# Patient Record
Sex: MALE | Race: WHITE
[De-identification: names, ages, dates, MRNs, and addresses within clinical notes are randomized per-mention and may not be internally consistent; named-entity substitution may affect disease eponyms.]

---

## 2018-11-09 ENCOUNTER — HOSPITAL ENCOUNTER (INPATIENT)
Dept: HOSPITAL 72 - EMR | Age: 68
LOS: 4 days | Discharge: HOME | DRG: 282 | End: 2018-11-13
Payer: MEDICARE

## 2018-11-09 VITALS — SYSTOLIC BLOOD PRESSURE: 114 MMHG | DIASTOLIC BLOOD PRESSURE: 66 MMHG

## 2018-11-09 VITALS — SYSTOLIC BLOOD PRESSURE: 114 MMHG | DIASTOLIC BLOOD PRESSURE: 97 MMHG

## 2018-11-09 VITALS — SYSTOLIC BLOOD PRESSURE: 141 MMHG | DIASTOLIC BLOOD PRESSURE: 71 MMHG

## 2018-11-09 VITALS — SYSTOLIC BLOOD PRESSURE: 158 MMHG | DIASTOLIC BLOOD PRESSURE: 86 MMHG

## 2018-11-09 VITALS — BODY MASS INDEX: 17.89 KG/M2 | WEIGHT: 114 LBS | HEIGHT: 67 IN

## 2018-11-09 VITALS — DIASTOLIC BLOOD PRESSURE: 94 MMHG | SYSTOLIC BLOOD PRESSURE: 166 MMHG

## 2018-11-09 VITALS — DIASTOLIC BLOOD PRESSURE: 71 MMHG | SYSTOLIC BLOOD PRESSURE: 156 MMHG

## 2018-11-09 VITALS — SYSTOLIC BLOOD PRESSURE: 114 MMHG | DIASTOLIC BLOOD PRESSURE: 51 MMHG

## 2018-11-09 VITALS — SYSTOLIC BLOOD PRESSURE: 121 MMHG | DIASTOLIC BLOOD PRESSURE: 45 MMHG

## 2018-11-09 VITALS — SYSTOLIC BLOOD PRESSURE: 139 MMHG | DIASTOLIC BLOOD PRESSURE: 76 MMHG

## 2018-11-09 VITALS — SYSTOLIC BLOOD PRESSURE: 94 MMHG | DIASTOLIC BLOOD PRESSURE: 45 MMHG

## 2018-11-09 VITALS — SYSTOLIC BLOOD PRESSURE: 115 MMHG | DIASTOLIC BLOOD PRESSURE: 50 MMHG

## 2018-11-09 VITALS — DIASTOLIC BLOOD PRESSURE: 51 MMHG | SYSTOLIC BLOOD PRESSURE: 154 MMHG

## 2018-11-09 VITALS — SYSTOLIC BLOOD PRESSURE: 135 MMHG | DIASTOLIC BLOOD PRESSURE: 78 MMHG

## 2018-11-09 DIAGNOSIS — I48.91: Primary | ICD-10-CM

## 2018-11-09 DIAGNOSIS — F32.9: ICD-10-CM

## 2018-11-09 DIAGNOSIS — Z88.0: ICD-10-CM

## 2018-11-09 DIAGNOSIS — J44.9: ICD-10-CM
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
## 2018-11-09 DIAGNOSIS — I34.0: ICD-10-CM

## 2018-11-09 DIAGNOSIS — Z88.1: ICD-10-CM

## 2018-11-09 DIAGNOSIS — F43.10: ICD-10-CM

## 2018-11-09 DIAGNOSIS — I50.9: ICD-10-CM

## 2018-11-09 DIAGNOSIS — Z90.49: ICD-10-CM

## 2018-11-09 DIAGNOSIS — Z79.01: ICD-10-CM

## 2018-11-09 DIAGNOSIS — I25.2: ICD-10-CM

## 2018-11-09 DIAGNOSIS — S00.512A: ICD-10-CM

## 2018-11-09 DIAGNOSIS — M47.892: ICD-10-CM

## 2018-11-09 DIAGNOSIS — F17.200: ICD-10-CM

## 2018-11-09 DIAGNOSIS — Z95.5: ICD-10-CM

## 2018-11-09 DIAGNOSIS — I21.4: ICD-10-CM

## 2018-11-09 DIAGNOSIS — I25.10: ICD-10-CM
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
## 2018-11-09 DIAGNOSIS — F41.9: ICD-10-CM

## 2018-11-09 LAB
ADD MANUAL DIFF: NO
ALBUMIN SERPL-MCNC: 3.5 G/DL (ref 3.4–5)
ALBUMIN/GLOB SERPL: 0.8 {RATIO} (ref 1–2.7)
ALP SERPL-CCNC: 99 U/L (ref 46–116)
ALT SERPL-CCNC: 23 U/L (ref 12–78)
ANION GAP SERPL CALC-SCNC: 8 MMOL/L (ref 5–15)
APTT BLD: 28 SEC (ref 23–33)
AST SERPL-CCNC: 48 U/L (ref 15–37)
BASOPHILS NFR BLD AUTO: 0.6 % (ref 0–2)
BILIRUB SERPL-MCNC: 0.6 MG/DL (ref 0.2–1)
BUN SERPL-MCNC: 17 MG/DL (ref 7–18)
CALCIUM SERPL-MCNC: 8.8 MG/DL (ref 8.5–10.1)
CHLORIDE SERPL-SCNC: 105 MMOL/L (ref 98–107)
CO2 SERPL-SCNC: 30 MMOL/L (ref 21–32)
CREAT SERPL-MCNC: 0.9 MG/DL (ref 0.55–1.3)
EOSINOPHIL NFR BLD AUTO: 1.5 % (ref 0–3)
ERYTHROCYTE [DISTWIDTH] IN BLOOD BY AUTOMATED COUNT: 13 % (ref 11.6–14.8)
GLOBULIN SER-MCNC: 4.5 G/DL
HCT VFR BLD CALC: 42 % (ref 42–52)
HGB BLD-MCNC: 13.4 G/DL (ref 14.2–18)
INR PPP: 1 (ref 0.9–1.1)
LYMPHOCYTES NFR BLD AUTO: 13.5 % (ref 20–45)
MCV RBC AUTO: 78 FL (ref 80–99)
MONOCYTES NFR BLD AUTO: 8.3 % (ref 1–10)
NEUTROPHILS NFR BLD AUTO: 76.1 % (ref 45–75)
PLATELET # BLD: 182 K/UL (ref 150–450)
POTASSIUM SERPL-SCNC: 4.7 MMOL/L (ref 3.5–5.1)
RBC # BLD AUTO: 5.4 M/UL (ref 4.7–6.1)
SODIUM SERPL-SCNC: 143 MMOL/L (ref 136–145)
WBC # BLD AUTO: 7.6 K/UL (ref 4.8–10.8)

## 2018-11-09 PROCEDURE — 94664 DEMO&/EVAL PT USE INHALER: CPT

## 2018-11-09 PROCEDURE — 96374 THER/PROPH/DIAG INJ IV PUSH: CPT

## 2018-11-09 PROCEDURE — 72125 CT NECK SPINE W/O DYE: CPT

## 2018-11-09 PROCEDURE — 85730 THROMBOPLASTIN TIME PARTIAL: CPT

## 2018-11-09 PROCEDURE — 71045 X-RAY EXAM CHEST 1 VIEW: CPT

## 2018-11-09 PROCEDURE — 94640 AIRWAY INHALATION TREATMENT: CPT

## 2018-11-09 PROCEDURE — 80061 LIPID PANEL: CPT

## 2018-11-09 PROCEDURE — 90715 TDAP VACCINE 7 YRS/> IM: CPT

## 2018-11-09 PROCEDURE — 85007 BL SMEAR W/DIFF WBC COUNT: CPT

## 2018-11-09 PROCEDURE — 80048 BASIC METABOLIC PNL TOTAL CA: CPT

## 2018-11-09 PROCEDURE — 70450 CT HEAD/BRAIN W/O DYE: CPT

## 2018-11-09 PROCEDURE — 93017 CV STRESS TEST TRACING ONLY: CPT

## 2018-11-09 PROCEDURE — 85025 COMPLETE CBC W/AUTO DIFF WBC: CPT

## 2018-11-09 PROCEDURE — 85610 PROTHROMBIN TIME: CPT

## 2018-11-09 PROCEDURE — 93005 ELECTROCARDIOGRAM TRACING: CPT

## 2018-11-09 PROCEDURE — 84100 ASSAY OF PHOSPHORUS: CPT

## 2018-11-09 PROCEDURE — 78452 HT MUSCLE IMAGE SPECT MULT: CPT

## 2018-11-09 PROCEDURE — 84484 ASSAY OF TROPONIN QUANT: CPT

## 2018-11-09 PROCEDURE — 71275 CT ANGIOGRAPHY CHEST: CPT

## 2018-11-09 PROCEDURE — 90471 IMMUNIZATION ADMIN: CPT

## 2018-11-09 PROCEDURE — 83735 ASSAY OF MAGNESIUM: CPT

## 2018-11-09 PROCEDURE — 93306 TTE W/DOPPLER COMPLETE: CPT

## 2018-11-09 PROCEDURE — 83880 ASSAY OF NATRIURETIC PEPTIDE: CPT

## 2018-11-09 PROCEDURE — 74176 CT ABD & PELVIS W/O CONTRAST: CPT

## 2018-11-09 PROCEDURE — 99285 EMERGENCY DEPT VISIT HI MDM: CPT

## 2018-11-09 PROCEDURE — 80053 COMPREHEN METABOLIC PANEL: CPT

## 2018-11-09 PROCEDURE — 85379 FIBRIN DEGRADATION QUANT: CPT

## 2018-11-09 PROCEDURE — 36415 COLL VENOUS BLD VENIPUNCTURE: CPT

## 2018-11-09 RX ADMIN — METOPROLOL TARTRATE SCH MG: 25 TABLET, FILM COATED ORAL at 22:04

## 2018-11-09 RX ADMIN — ATORVASTATIN CALCIUM SCH MG: 20 TABLET, FILM COATED ORAL at 22:04

## 2018-11-09 RX ADMIN — APIXABAN SCH MG: 2.5 TABLET, FILM COATED ORAL at 17:47

## 2018-11-09 NOTE — DIAGNOSTIC IMAGING REPORT
Indication: Abdominal pain. Trauma

 

Technique: Continuous helical transaxial imaging of the abdomen and pelvis was

obtained from the lung bases to the pubic symphysis. No intravenous contrast was

administered. Coronal 2-D reformats were also obtained. Automatic Exposure Control

was utilized.

 

 

Total Dose length Product (DLP):  498.11 mGycm

 

CT Dose Index Volume (CTDIvol):   10.21 mGy

 

Comparison: none

 

Findings: The lung bases are clear. There is no pneumothorax identified. No pleural

effusion identified.

 

Evaluation of solid organs limited without IV contrast material but no obvious trauma

identified. There is no free fluid or free air identified. Appendix is normal. There

is thickening of the wall the urinary bladder. Some diverticula noted in the colon.

No hydronephrosis seen. Rounded hyperdense nodules noted in both kidneys nonspecific

though probably hemorrhagic or proteinaceous cysts. A low-density round focus

measuring about 1 cm noted in the left kidney. Cholecystectomy noted.

 

Pneumobilia is present. Correlate for previous ERCP and papillotomy. Duodenal

diverticulum noted in the second portion of the duodenum. Aortoiliac calcifications

are present. Small left inguinal hernia containing fat demonstrated. Bilateral pars

interarticularis defects demonstrated at L5. Minimal anterolisthesis of L5 on S1

noted. No obvious fracture identified.

 

IMPRESSION:

 

No acute injury is identified. No free air or free fluid identified within the

abdomen or pelvis.

 

Pneumobilia presumably on the basis of previous papillotomy and ERCP. Correlate

clinically.

 

Bilateral pars interarticularis defects at L5. Minimal L5 on S1 spondylolisthesis.

 

Multiple additional incidental findings as described above.

 

.

 

 

The CT scanner at [facility] is accredited by the American College of

Radiology and the scans are performed using dose optimization techniques as

appropriate to a performed exam including Automatic Exposure control.

## 2018-11-09 NOTE — DIAGNOSTIC IMAGING REPORT
Indication: Headache and head trauma

 

Technique: Contiguous 5 mm thick transaxial imaging of the head obtained in a Siemens

Sensation 64 slice CT scanner.  Soft tissue and bone windows generated.  Automatic

Exposure Control was utilized.

 

 

Total Dose length Product (DLP):  1319.78 mGycm

 

CT Dose Index Volume (CTDIvol):   70.38 mGy

 

Comparison: none

 

Findings: There is moderate prominence of the ventricles, basal cisterns, and

cerebral sulci consistent with atrophy. Moderate,  nonspecific, white matter

hypoattenuation is noted throughout the brain consistent with chronic small vessel

disease.

 

There is no midline shift, edema, acute hemorrhage, mass effect, or abnormal

extra-axial fluid collections. Bones and extra osseous soft tissues are unremarkable.

 

Impression: No acute intracranial bleed, mass effect or edema.

 

Moderate atrophy of the brain.

 

Evidence of chronic small vessel disease involving white matter tracts.

 

 

 

The CT scanner at [facility] is accredited by the American College of

Radiology and the scans are performed using dose optimization techniques as

appropriate to a performed exam including Automatic Exposure control.

## 2018-11-09 NOTE — CONSULTATION
DATE OF CONSULTATION:  11/09/2018

CARDIOLOGY CONSULTATION



CONSULTING PHYSICIAN:  Camilo Pardo M.D.



REFERRING PHYSICIAN:  Gary Peter M.D.



REASON FOR CONSULTATION:  Atrial fibrillation with rapid ventricular

response.



HISTORY OF PRESENT ILLNESS:  The patient is a 68-year-old 

gentleman with a history of hypertension, severe COPD, and history of

cardiac arrhythmia on Eliquis, who came to the emergency room with

spitting of blood from his mouth.  He also fell off a ladder yesterday

that was 5 feet high and fell onto a pile of junk in a closet.  The

patient states he did not lose consciousness, but noticed bleeding from

his mouth.  The patient came to the emergency room.  He was found to be in

atrial fibrillation with rapid ventricular response, heart rate of 120

beats per minute.  The patient was admitted to telemetry.  However, at the

time of my evaluation, his heart rate was in the 140s.



REVIEW OF SYSTEMS:  Review of systems was negative other than what was

mentioned in history of present illness.



PAST MEDICAL HISTORY:

1. Hypertension.

2. History of cardiac arrhythmia, likely atrial fibrillation as the

patient is on Eliquis.

3. COPD.

4. Asthma.

5. Posttraumatic stress disorder.



FAMILY HISTORY:  Noncontributory.



SOCIAL HISTORY:  He has 3-pack a day smoking history, but has cut down to 1

pack a day.  Continue to drink alcohol.  Denies using street drugs.



PHYSICAL EXAMINATION:

VITAL SIGNS:  Blood pressure is 135/78, pulse 140, respirations 18, and he

is afebrile.

HEAD AND NECK:  Shows no JVD.

LUNGS:  Coarse rhonchi.

CARDIOVASCULAR:  Irregularly irregular.  S1 and S2 with no gallop or

murmur.

ABDOMEN:  Soft.

EXTREMITIES:  No pitting edema.  There is diffuse ecchymoses.



LABORATORY AND DIAGNOSTIC DATA:  White count of 7.2, hemoglobin 13.4,

hematocrit 42, and platelets of 182.  Sodium 142, potassium 4.7, BUN of

17, creatinine 0.9.  Troponin 0.304.



ASSESSMENT AND PLAN:

1. Elevated troponin.  The patient has a history of coronary artery

disease with prior stent placement three months ago.  He said he had it at

[facility].  Completely rule out myocardial infarction protocol with repeat

EKG and put him on aspirin beta-blocker and statin and try to control the

rate of atrial fibrillation.

2. Atrial fibrillation with rapid ventricular response.  I will transfer

the patient to intensive care unit on Cardizem drip.  Since his troponin

is also elevated, repeat EKG and get an echocardiogram.  Currently he does

not have any chest pain, put him on aspirin and resume his Eliquis until

further evaluation is completed.

3. Severe COPD and heavy smoking.



Thank you very much, Dr. Peter, for allowing me participate in the

care of this patient.  Please do not hesitate to contact me for any

questions regarding my evaluation.



Sincerely,









  ______________________________________________

  Camilo Pardo M.D.





DR:  Liv

D:  11/09/2018 13:15

T:  11/09/2018 21:33

JOB#:  [id number]

CC:

## 2018-11-09 NOTE — EMERGENCY ROOM REPORT
History of Present Illness


General


Chief Complaint:  Multiple Trauma/Fall


Source:  Patient





Present Illness


HPI


68-year-old male with a history of COPD, arrhythmia on eliquis, presents with 

spitting up blood from his mouth, he reports no pain complaints, but reports 

that he fell off a ladder yesterday, it was roughly 5 feet high, it tipped over 

and he fell onto a pile of junk in the closet, he did not hit his head or lose 

consciousness, and he didn't think much of it until this morning when he 

started noticing blood in his mouth.  He denies neck or back pain, vomiting, 

does report he's been bleeding from his arms from the fall yesterday but 

stopped temporarily and starts again so he finally came in for an evaluation.


Allergies:  


Coded Allergies:  


     AMPICILLIN (Verified  Allergy, Unknown, 8/3/18)


Uncoded Allergies:  


     PENICILLIN (Allergy, Unknown, 8/3/18)





Patient History


Past Medical History:  see triage record


Reviewed Nursing Documentation:  PMH: Agreed; PSxH: Agreed





Nursing Documentation-PMH


Past Medical History:  No History, Except For


Hx Cardiac Problems:  Yes - bradycardia, Afib


Hx Hypertension:  Yes


Hx Pacemaker:  No


Hx Asthma:  Yes


Hx COPD:  Yes


Hx Diabetes:  No


Hx Cancer:  No


Hx Gastrointestinal Problems:  No


Hx Dialysis:  No


History Of Psychiatric Problem:  Yes - PTSD


Hx Neurological Problems:  No


Hx Cerebrovascular Accident:  No


Hx Seizures:  No





Review of Systems


All Other Systems:  negative except mentioned in HPI





Physical Exam





Vital Signs








  Date Time  Temp Pulse Resp B/P (MAP) Pulse Ox O2 Delivery O2 Flow Rate FiO2


 


11/9/18 09:42 97.9 119 23 154/51 95 Room Air  








Sp02 EP Interpretation:  reviewed, normal


General Appearance:  alert, non-toxic, mild distress


Head:  normocephalic


Eyes:  bilateral eye normal inspection, bilateral eye PERRL, bilateral eye EOMI


ENT:  normal ENT inspection, hearing grossly normal, normal pharynx, no 

angioedema, normal voice, moist mucus membranes, other - Multiple missing teeth

, dried blood and abrasions on the internal aspect of the upper and lower lip, 

no loose teeth


Neck:  normal inspection, full range of motion, supple, supple/symm/no masses


Respiratory:  chest non-tender, lungs clear, normal breath sounds, no rhonchi, 

speaking full sentences, wheezing, expiration, chest symmetrical, palpation of 

chest normal


Cardiovascular #1:  normal peripheral pulses, regular rate, rhythm, no edema, 

no gallop, no JVD, no murmur, no rub, tachycardia


Cardiovascular #2:  2+ radial (R), 2+ radial (L)


Gastrointestinal:  normal inspection, non tender, soft, no mass, no guarding, 

no rebound


Rectal:  deferred


Genitourinary:  normal inspection, no CVA tenderness


Musculoskeletal:  back normal, gait/station normal, normal range of motion, non-

tender, no calf tenderness


Neurologic:  alert, responsive, CNs III-XII nml as tested, motor strength/tone 

normal, sensory intact, speech normal


Psychiatric:  judgement/insight normal, memory normal, mood/affect normal


Skin:  normal color, no rash, warm/dry, normal turgor, other - A few 1 cm Skin 

tears on bilateral Arms no lacerations, abrasions


Lymphatic:  no adenopathy





Medical Decision Making


Diagnostic Impression:  


 Primary Impression:  


 COPD (chronic obstructive pulmonary disease)


 Additional Impressions:  


 Fall


 Skin tear


 Abrasion


 Afib


ER Course


Patient reported spitting up blood, but he has obvious oral abrasions, which 

are the likely source, he is on Plavix as well as an anticoagulant for his 

known history of arrhythmia, he is slightly short of breath due to his known 

COPD and I have treated him with neb treatments as well as steroids, obtain CT 

head and C-spine as well as abdomen and pelvis to evaluate for any spontaneous 

bleeds although patient did not have any pain complaints he seems somewhat 

unreliable historian because he initially can't calmly why he is on blood 

thinners and then eventually does admit that he has an arrhythmia.





patient found to have an elevated troponin, but as he is on an anticoagulated 

on Plavix and intermittent from his mouth, I will not give him aspirin; he is 

in slightly rapid A. fib, and he is also having COPD exacerbation,will be 

admitted to telemetry.


EKG Diagnostic Results


EKG Time:  10:02


EP Interpretation:  afib rate 107, no stemi


Rate:  tachycardiac


Rhythm:  other - afib


ST Segments:  no acute changes


ASA given to the pt in ED:  No





Rhythm Strip Diag. Results


Rhythm Strip Time:  09:57


EP Interpretation:  yes


Rate:  106


Rhythm:  no PVC's, no ectopy, other - A. fib





Chest X-Ray Diagnostic Results


Chest X-Ray Diagnostic Results :  


   Chest X-Ray Ordered:  Yes


   # of Views/Limited/Complete:  1 View


   Indication:  Shortness of Breath


   EP Interpretation:  Yes


   Interpretation:  no consolidation, no effusion, no pneumothorax, no acute 

cardiopulmonary disease


   Impression:  No acute disease


   Electronically Signed by:  Tank Fernandes MD





Last Vital Signs








  Date Time  Temp Pulse Resp B/P (MAP) Pulse Ox O2 Delivery O2 Flow Rate FiO2


 


11/9/18 09:42 97.9 119 23 154/51 95 Room Air  








Disposition:  ADMITTED AS INPATIENT


Condition:  Stable


Signed Out To:  Dr. Don for TANK Corcoran M.D Nov 9, 2018 09:59

## 2018-11-09 NOTE — HISTORY AND PHYSICAL
History of Present Illness


General


Date patient seen:  Nov 9, 2018


Time patient seen:  13:00


Reason for Hospitalization:  Afib RVR





Present Illness


HPI


67 yo M PMH of Afib wih RVR on Elaquis, CHF, MI s/p PCI on 3/18 at [facility], COPD

, MDD, admitted s/p 5 foot mechanical fall from a ladder. Upon Evaluation in 

the ED patient was found to have elevated troponin of 0.25 and Afib RVR with HR 

146. Patient states he was getting on a ladder at home to fix something when 

the ladder slipped and he felt. He denies any inciting chest pain, dizziness, 

sob, of lightheadedness. Patient was been admitted may time before for Afb with 

RVR. Other than his fall he denies any unusual activities including drug use. 

Patient admits to history of smoking tobacco. Patient is unable to list all his 

medications but states that he has been taking them. Patient also initially 

admitted to spitting up blood but says it is resolved. 





At this time patient continues to deny chest pain, shortness of breath, 

dizziness, visual changes, weakness. 





Code Status: Full Code


Allergies:  


Coded Allergies:  


     AMPICILLIN (Verified  Allergy, Unknown, 8/3/18)


Uncoded Allergies:  


     PENICILLIN (Allergy, Unknown, 8/3/18)





Medication History


Scheduled


Apixaban (Eliquis), 5 MG PO TWICE A DAY, (Reported)


Aspirin Ec* (Aspirin Ec*), 81 MG ORAL DAILY, (Reported)


Atorvastatin Calcium* (Atorvastatin Calcium*), 40 MG ORAL BEDTIME, (Reported)


Carvedilol (Coreg), 12.5 MG ORAL EVERY 12 HOURS, (Reported)


Carvedilol (Coreg), 12.5 MG ORAL EVERY 12 HOURS, (Reported)


Clopidogrel Bisulfate* (Plavix*), 75 MG ORAL DAILY, (Reported)


Docusate Sodium* (Docusate Sodium*), 100 MG ORAL Q12HR, (Reported)


Famotidine (Famotidine), 20 MG ORAL TWICE A DAY, (Reported)


Furosemide (Furosemide), 40 MG PO DAILY, (Reported)


Ipratropium/Albuterol Sulfate (DuoNeb 0.5-3(2.5)mg/3ml), 3 ML HHN Q4HR, (

Reported)


Prednisone* (Prednisone*), 30 MG ORAL DAILY, (Reported)


Prednisone* (Prednisone*), 30 MG ORAL DAILY, (Reported)





Scheduled PRN


Acetaminophen* (Acetaminophen 325MG Tablet*), 650 MG ORAL Q4H PRN for Mild Pain/

Temp > 100.5, (Reported)


Doxycycline Hyclate* (Vibramycin*), 100 MG ORAL EVERY 12 HOURS PRN for x3 days, 

(Reported)


Polyethylene Glycol 3350* (Miralax*), 17 GM ORAL Q12HR PRN for Constipation, (

Reported)


Tramadol Hcl* (Ultram*), 50 MG ORAL Q6H PRN for For Pain


Zolpidem Tartrate* (Ambien*), 5 MG ORAL BEDTIME PRN for Insomnia, (Reported)





Patient History


History Provided By:  Patient, Medical Record


Healthcare decision maker





Resuscitation status





Advanced Directive on File








Past Medical/Surgical History


Past Medical/Surgical History:  


(1) S/P cholecystectomy


(2) Status post left heart catheterization by percutaneous approach


(3) COPD (chronic obstructive pulmonary disease)


(4) Afib


(5) Atrial fibrillation with RVR


(6) Acute systolic CHF (congestive heart failure)





Social History


Social History:  


(1) Tobacco abuse





Review of Systems


All Other Systems:  negative except mentioned in HPI


ROS Narrative


aside from HPI all other ROS are negative including more than 12 systems





Physical Exam


General Appearance:  WD/WN, no apparent distress, alert, other


Lines, tubes and drains:  peripheral


HEENT:  normocephalic, other - abrasion on forehead, and in mouth, not bleeding


Neck:  non-tender, normal alignment, supple


Respiratory/Chest:  chest wall non-tender, lungs clear, other - inspiratory 

wheezes, patient 02 sat ia 100% on room air


Cardiovascular/Chest:  normal peripheral pulses, normal rate, no gallop/murmur, 

no JVD, tachycardia - rate 135-145


Abdomen:  normal bowel sounds, non tender, soft, no organomegaly, no mass


Extremities:  normal range of motion, non-tender, normal inspection, no calf 

tenderness, normal capillary refill, non-pitting


Skin Exam:  normal pigmentation, warm/dry


Neurologic:  CNs II-XII grossly normal, no motor/sensory deficits, alert, 

oriented x 3, normal mood/affect





Last 24 Hour Vital Signs








  Date Time  Temp Pulse Resp B/P (MAP) Pulse Ox O2 Delivery O2 Flow Rate FiO2


 


11/9/18 12:45 97.9 99 20 135/78 100 Nasal Cannula 2.0 28


 


11/9/18 12:30 97.9 99 20 135/78 100 Nasal Cannula 2.0 28


 


11/9/18 11:49 97.9 106 20 139/76 100 Nasal Cannula 2.0 28


 


11/9/18 10:18  100 20  100 Nasal Cannula 2.0 28


 


11/9/18 10:08 97.9 99 22 154/51 100 Nasal Cannula 2.0 28


 


11/9/18 10:08  106 22   Nasal Cannula 2.0 28


 


11/9/18 09:59  101 22  100 Nasal Cannula 2.0 28


 


11/9/18 09:46  119 23   Room Air  


 


11/9/18 09:42 97.9 119 23 154/51 95 Room Air  











Laboratory Tests








Test


  11/9/18


10:05


 


White Blood Count


  7.6 K/UL


(4.8-10.8)


 


Red Blood Count


  5.40 M/UL


(4.70-6.10)


 


Hemoglobin


  13.4 G/DL


(14.2-18.0)  L


 


Hematocrit


  42.0 %


(42.0-52.0)


 


Mean Corpuscular Volume


  78 FL (80-99)


L


 


Mean Corpuscular Hemoglobin


  24.9 PG


(27.0-31.0)  L


 


Mean Corpuscular Hemoglobin


Concent 32.0 G/DL


(32.0-36.0)


 


Red Cell Distribution Width


  13.0 %


(11.6-14.8)


 


Platelet Count


  182 K/UL


(150-450)


 


Mean Platelet Volume


  8.5 FL


(6.5-10.1)


 


Neutrophils (%) (Auto)


  76.1 %


(45.0-75.0)  H


 


Lymphocytes (%) (Auto)


  13.5 %


(20.0-45.0)  L


 


Monocytes (%) (Auto)


  8.3 %


(1.0-10.0)


 


Eosinophils (%) (Auto)


  1.5 %


(0.0-3.0)


 


Basophils (%) (Auto)


  0.6 %


(0.0-2.0)


 


Prothrombin Time


  10.8 SEC


(9.30-11.50)


 


Prothromb Time International


Ratio 1.0 (0.9-1.1)  


 


 


Activated Partial


Thromboplast Time 28 SEC (23-33)


 


 


Sodium Level


  143 MMOL/L


(136-145)


 


Potassium Level


  4.7 MMOL/L


(3.5-5.1)


 


Chloride Level


  105 MMOL/L


()


 


Carbon Dioxide Level


  30 MMOL/L


(21-32)


 


Anion Gap


  8 mmol/L


(5-15)


 


Blood Urea Nitrogen


  17 mg/dL


(7-18)


 


Creatinine


  0.9 MG/DL


(0.55-1.30)


 


Estimat Glomerular Filtration


Rate > 60 mL/min


(>60)


 


Glucose Level


  91 MG/DL


()


 


Calcium Level


  8.8 MG/DL


(8.5-10.1)


 


Total Bilirubin


  0.6 MG/DL


(0.2-1.0)


 


Aspartate Amino Transf


(AST/SGOT) 48 U/L (15-37)


H


 


Alanine Aminotransferase


(ALT/SGPT) 23 U/L (12-78)


 


 


Alkaline Phosphatase


  99 U/L


()


 


Troponin I


  0.204 ng/mL


(0.000-0.056)


 


Total Protein


  8.0 G/DL


(6.4-8.2)


 


Albumin


  3.5 G/DL


(3.4-5.0)


 


Globulin 4.5 g/dL  


 


Albumin/Globulin Ratio


  0.8 (1.0-2.7)


L








Height (Feet):  5


Height (Inches):  9.00


Weight (Pounds):  150


Medications





Current Medications








 Medications


  (Trade)  Dose


 Ordered  Sig/Marissa


 Route


 PRN Reason  Start Time


 Stop Time Status Last Admin


Dose Admin


 


 Acetaminophen


  (Tylenol)  650 mg  Q4H  PRN


 ORAL


 Fever  11/9/18 13:15


 12/9/18 13:14 UNV  


 


 


 Acetaminophen


  (Tylenol)  650 mg  Q4H  PRN


 ORAL


 Mild Pain (Pain Scale 1-3)  11/9/18 13:15


 12/9/18 13:14 UNV  


 


 


 Albuterol/


 Ipratropium


  (Albuterol/


 Ipratropium)  3 ml  Q4HRT  PRN


 HHN


 Shortness of Breath  11/9/18 13:15


 11/14/18 13:14 UNV  


 


 


 Apixaban


  (Eliquis)  5 mg  BID


 ORAL


   11/9/18 13:15


 12/9/18 13:14 UNV  


 


 


 Aspirin


  (ASA)  81 mg  DAILY


 ORAL


   11/10/18 09:00


 12/10/18 08:59   


 


 


 Aspirin


  (ASA)  81 mg  DAILY


 ORAL


   11/9/18 13:15


 12/9/18 13:14 UNV  


 


 


 Atorvastatin


 Calcium


  (Lipitor)  20 mg  BEDTIME


 ORAL


   11/9/18 21:00


 12/9/18 20:59   


 


 


 Atorvastatin


 Calcium


  (Lipitor)  40 mg  BEDTIME


 ORAL


   11/9/18 21:00


 12/9/18 20:59 UNV  


 


 


 Clopidogrel


 Bisulfate


  (Plavix)  75 mg  DAILY


 ORAL


   11/9/18 13:15


 12/9/18 13:14 UNV  


 


 


 Dextrose


  (Dextrose 50%)  25 ml  Q30M  PRN


 IV


 Hypoglycemia  11/9/18 13:15


 12/9/18 13:14 UNV  


 


 


 Dextrose


  (Dextrose 50%)  50 ml  Q30M  PRN


 IV


 Hypoglycemia  11/9/18 13:15


 12/9/18 13:14 UNV  


 


 


 Diltiazem HCl


  (Cardizem)  10 mg  ONCE


 IVP


   11/9/18 13:30


 11/9/18 14:30   


 


 


 Diltiazem HCl 125


 mg/Dextrose  125 ml @ 0


 mls/hr  Q24H


 IV


   11/9/18 14:00


 11/10/18 13:59   


 


 


 Heparin Sodium


  (Porcine)


  (Heparin 5000


 units/ml)  5,000 units  EVERY 12  HOURS


 SUBQ


   11/9/18 21:00


 12/9/18 20:59 UNV  


 


 


 Lisinopril


  (Zestril)  5 mg  DAILY


 ORAL


   11/10/18 09:00


 12/10/18 08:59 UNV  


 


 


 Metoprolol


 Tartrate


  (Lopressor)  25 mg  EVERY 12  HOURS


 ORAL


   11/9/18 21:00


 12/9/18 20:59   


 


 


 Pantoprazole


  (Protonix)  40 mg  DAILY


 ORAL


   11/10/18 09:00


 12/10/18 08:59 UNV  


 


 


 Zolpidem Tartrate


  (Ambien)  5 mg  HSPRN  PRN


 ORAL


 Insomnia  11/9/18 13:15


 11/16/18 13:14 UNV  


 











Assessment/Plan


Problem List:  


(1) Atrial fibrillation with RVR


ICD Codes:  I48.91 - Unspecified atrial fibrillation


SNOMED:  271789708629754


(2) NSTEMI (non-ST elevated myocardial infarction)


ICD Codes:  I21.4 - Non-ST elevation (NSTEMI) myocardial infarction


SNOMED:  655188012


(3) CAD (coronary artery disease)


ICD Codes:  I25.10 - Atherosclerotic heart disease of native coronary artery 

without angina pectoris


SNOMED:  82213293


(4) CHF (congestive heart failure)


ICD Codes:  I50.9 - Heart failure, unspecified


SNOMED:  23380333


(5) COPD (chronic obstructive pulmonary disease)


ICD Codes:  J44.9 - Chronic obstructive pulmonary disease, unspecified


SNOMED:  99159286


(6) Tobacco abuse


ICD Codes:  Z72.0 - Tobacco use


SNOMED:  651740324


(7) Major depression


ICD Codes:  F32.9 - Major depressive disorder, single episode, unspecified


SNOMED:  164041469


Assessment/Plan


Afib RVR with NSTEMI and history of PCI s/p stent at [facility] 3/18/ history of 

CHF 


- patient uncontrolled and high risk, admitting to ICU for cardizem gtt, 

discussed with Cardiology Dr. Pardo 


- appreciate Cardiology consult by Dr. Pardo 


- no hep gtt at this time 


- cont asa, plavix, eliquis, BB, ACE-I, statin, 


- serial troponins and EKG 


- echo


- monitor BP goal Map >65 , HR < 110, plan will be to wean cardizem gtt and 

transition to oral medication once controlled 





COPD and current tobacco abuse 


- Pulmonology Consult: Dr. Chalino figueroa 


- patient unaware if he was on steroids, holding for now will monitor and 

discuss with pulmonology 


- nicotine patch 


- tobacco cessation education





Major Depression


- Psychiatry Consult: Dr. Medina 





Code Status


- spoke to patient regarding code status and clarified that patient would like 

to be FULL CODE. He mentions he was referred to a hospice program? but unsure 

why and was not explained well about his medical condition





DVT


- patient on sufficient anticoagulation





Dispo 


- pending 





I have spent 70 minutes regarding patient care, coordination and counseling. I 

have spent 30 minutes of critical care time with patient.











Charity Don DO Nov 9, 2018 13:47

## 2018-11-09 NOTE — DIAGNOSTIC IMAGING REPORT
Indication: Dyspnea

 

Comparison:  8/29/2018

 

A single view chest radiograph was obtained.

 

Findings:

 

Cardiomediastinal appearance is within normal limits for age. The lungs are clear.

Pulmonary vascularity is appropriate. The diaphragmatic contour is smooth and

costophrenic angles are sharp. No pleural effusions are identified. The bones are

osteopenic.

 

Impression: No acute findings

## 2018-11-09 NOTE — DIAGNOSTIC IMAGING REPORT
Indication: Neck pain. Trauma

 

Technique: Continuous helical imaging of the cervical spine was obtained transaxially

from the skull base to the upper thoracic spine. 2-D coronal and sagittal reformatted

images were obtained. Automatic Exposure Control was utilized.

 

 

Total Dose length Product (DLP):  332.96 mGycm

 

CT Dose Index Volume (CTDIvol):   15.3 mGy

 

Comparison: None

 

Findings: There is no acute fracture or malalignment identified. There is no soft

tissue swelling identified.

 

Moderate uncovertebral arthritis is demonstrated at multiple levels with resultant

foraminal stenosis. Some of the intervertebral discs show narrowing. There are

prominent osteophytes especially at C4-5 C5-6 and C6-7.

 

Impression:  No acute injury

 

Moderate spondylosis

 

 

 

The CT scanner at [facility] is accredited by the American College of

Radiology and the scans are performed using dose optimization techniques as

appropriate to a performed exam including Automatic Exposure control.

## 2018-11-09 NOTE — PULMONOLGY CRITICAL CARE NOTE
Critical Care - Asmt/Plan


Assessment/Plan:


Patient is a 68-year-old male with a history of COPD/Asthma, HTN, atrial 

fibrillation on Eliquis, ptsd presents with spitting up blood from his mouth, 

he reports no pain complaints, but reports that he fell off a ladder yesterday, 

it was roughly 5 feet high, it tipped over and he fell onto a pile of junk in 

the closet, he did not hit his head or lose consciousness, and he didn't think 

much of it until this morning when he started noticing blood in his mouth.  He 

denies neck or back pain, vomiting, does report he's been bleeding from his 

arms from the fall yesterday but stopped temporarily and starts again so he 

finally came in for an evaluation. CT Head/neck no evidence fracture, IC 

pathology





Allergies:  


 


     AMPICILLIN (Verified  Allergy, Unknown, 8/3/18)  


     PENICILLIN (Allergy, Unknown, 8/3/18)








Reviewed Nursing Documentation:  PMH: Agreed; PSxH: Agreed





Nursing Documentation-PMH


Past Medical History:  No History, Except For


Hx Cardiac Problems:  Yes - bradycardia, Afib


Hx Hypertension:  Yes


Hx Pacemaker:  No


Hx Asthma:  Yes


Hx COPD:  Yes


Hx Diabetes:  No


Hx Cancer:  No


Hx Gastrointestinal Problems:  No


Hx Dialysis:  No


History Of Psychiatric Problem:  Yes - PTSD


Hx Neurological Problems:  No


Hx Cerebrovascular Accident:  No


Hx Seizures:  No





Review of Systems


All Other Systems:  negative except mentioned in HPI





Physical Exam





Vital Signs noted








General Appearance:  alert, non-toxic, mild distress


Head:  normocephalic


Eyes:  bilateral eye normal inspection, bilateral eye PERRL, bilateral eye EOMI


ENT:  normal ENT inspection, hearing grossly normal, normal pharynx, no 

angioedema, normal voice, moist mucus membranes, other - Multiple missing teeth

, dried blood and abrasions on the internal aspect of the upper and lower lip, 

no loose teeth


Neck:  normal inspection, full range of motion, supple, supple/symm/no masses


Respiratory:  chest non-tender, lungs clear, normal breath sounds, no rhonchi, 

speaking full sentences, wheezing, expiration, chest symmetrical, palpation of 

chest normal


Cardiovascular normal peripheral pulses, regular rate, rhythm, no edema, no 

gallop, no JVD, no murmur, no rub, tachycardia


Gastrointestinal:  normal inspection, non tender, soft, no mass, no guarding, 

no rebound


Rectal:  deferred


Genitourinary:  normal inspection, no CVA tenderness


Musculoskeletal:  back normal, gait/station normal, normal range of motion, non-

tender, no calf tenderness


Neurologic:  alert, responsive, CNs III-XII nml as tested, motor strength/tone 

normal, sensory intact, speech normal


Psychiatric:  judgement/insight normal, memory normal, mood/affect normal


Skin:  normal color, no rash, warm/dry, normal turgor, other - A few 1 cm Skin 

tears on bilateral Arms no lacerations, abrasions


Lymphatic:  no adenopathy





EKG Time:  10:02


EP Interpretation:  afib rate 107, no stemi


Rate:  tachycardiac


Rhythm:  other - afib


ST Segments:  no acute changes


ASA given to the pt in ED:  No





 


   Chest X-Ray: 


   Indication:  Shortness of Breath


   EP Interpretation:  Yes


   Interpretation:  no consolidation, no effusion, no pneumothorax, no acute 

cardiopulmonary disease


   Impression:  No acute disease








Impression:  


 


 COPD (chronic obstructive pulmonary disease)


 S/p Fall


 Skin tear, oral abrasions


 Abrasion


 Afibrillation


 H/o HTN





For COPD comntinue with nebulized treatments as well as steroids


Follow Troponin


Admit ICU


Serial Labs








Critical Care - Objective





Last 24 Hour Vital Signs








  Date Time  Temp Pulse Resp B/P (MAP) Pulse Ox O2 Delivery O2 Flow Rate FiO2


 


11/9/18 19:00  77 27 114/97 (103) 99   


 


11/9/18 18:00  87 26 158/86 (110) 97   


 


11/9/18 17:00  96 15 156/71 (99) 100   


 


11/9/18 16:00  89      


 


11/9/18 16:00 98.2 91 27 115/50 (71) 100   


 


11/9/18 16:00      Nasal Cannula 2.0 


 


11/9/18 15:00  105 24 166/94 (118) 100   


 


11/9/18 14:50  102  143/61    


 


11/9/18 14:00  109 25 141/71 (94) 100   


 


11/9/18 13:37      Nasal Cannula 2.0 


 


11/9/18 12:45 97.9 99 20 135/78 100 Nasal Cannula 2.0 28


 


11/9/18 12:30 97.9 99 20 135/78 100 Nasal Cannula 2.0 28


 


11/9/18 11:49 97.9 106 20 139/76 100 Nasal Cannula 2.0 28


 


11/9/18 10:18  100 20  100 Nasal Cannula 2.0 28


 


11/9/18 10:08 97.9 99 22 154/51 100 Nasal Cannula 2.0 28


 


11/9/18 10:08  106 22   Nasal Cannula 2.0 28


 


11/9/18 09:59  101 22  100 Nasal Cannula 2.0 28 11/9/18 09:46  119 23   Room Air  


 


11/9/18 09:42 97.9 119 23 154/51 95 Room Air  











Critical Care - Subjective


ROS Limited/Unobtainable:  No


FI02:  28


Sputum Amount:  Scant











Jonathan Allred MD Nov 9, 2018 20:40

## 2018-11-09 NOTE — CONSULTATION
DATE OF CONSULTATION:  11/09/2018

HISTORY OF PRESENT ILLNESS:  This is a 68-year-old male with history of

multiple medical problems including AFib, MI, CHF, COPD, PTSD, and _____,

who has been admitted to the hospital due to AFib.  The patient has severe

anxiety and is very irritable.  Has poor insight and judgment.  He stated

that he had a PTSD and has nightmares and flashbacks.  I discussed with

him in regards to antipsychotics.  He is reluctant to take any

medications, then he agreed to take as needed medication.



PAST PSYCHIATRIC HISTORY:  As I mentioned, PTSD, depression, and anxiety.

No psychiatric hospitalizations.



PAST MEDICAL HISTORY:  Includes atrial fibrillation, congestive heart

failure, myocardial infarction, and chronic obstructive pulmonary

disease.



ALLERGIES:  Ampicillin and penicillin.



SUBSTANCE ABUSE HISTORY:  Extensive for smoking.  He is still a heavy

smoker.



MENTAL STATUS EXAMINATION:  The patient is alert and oriented times self,

place, and situation.  Mood is anxious and irritable.  Affect is

constricted.  Congruent with mood.  Thought process is concrete.  Thought

content, no suicidal or homicidal ideation.



ASSESSMENT:

AXIS I  Major depressive disorder and PTSD.

AXIS II  Deferred.

AXIS III  As above.

AXIS IV  Low-to-moderate.

AXIS V  Global assessment of functioning is 50.



PLAN:

1. We will start the patient on Ativan p.r.n.

2. Remeron p.r.n.

3. We will continue to follow and readjust the medications.









  ______________________________________________

  Rodri Medina M.D.





DR:  ELVIRA

D:  11/09/2018 14:11

T:  11/09/2018 21:06

JOB#:  [id number]

CC:

## 2018-11-09 NOTE — CARDIAC ELECTROPHYSIOLOGY PN
Subjective


Subjective


[id number]





Objective





Last 24 Hour Vital Signs








  Date Time  Temp Pulse Resp B/P (MAP) Pulse Ox O2 Delivery O2 Flow Rate FiO2


 


11/9/18 12:45 97.9 99 20 135/78 100 Nasal Cannula 2.0 28


 


11/9/18 12:30 97.9 99 20 135/78 100 Nasal Cannula 2.0 28


 


11/9/18 11:49 97.9 106 20 139/76 100 Nasal Cannula 2.0 28


 


11/9/18 10:18  100 20  100 Nasal Cannula 2.0 28


 


11/9/18 10:08 97.9 99 22 154/51 100 Nasal Cannula 2.0 28


 


11/9/18 10:08  106 22   Nasal Cannula 2.0 28


 


11/9/18 09:59  101 22  100 Nasal Cannula 2.0 28


 


11/9/18 09:46  119 23   Room Air  


 


11/9/18 09:42 97.9 119 23 154/51 95 Room Air  











Laboratory Tests








Test


  11/9/18


10:05


 


White Blood Count


  7.6 K/UL


(4.8-10.8)


 


Red Blood Count


  5.40 M/UL


(4.70-6.10)


 


Hemoglobin


  13.4 G/DL


(14.2-18.0)  L


 


Hematocrit


  42.0 %


(42.0-52.0)


 


Mean Corpuscular Volume


  78 FL (80-99)


L


 


Mean Corpuscular Hemoglobin


  24.9 PG


(27.0-31.0)  L


 


Mean Corpuscular Hemoglobin


Concent 32.0 G/DL


(32.0-36.0)


 


Red Cell Distribution Width


  13.0 %


(11.6-14.8)


 


Platelet Count


  182 K/UL


(150-450)


 


Mean Platelet Volume


  8.5 FL


(6.5-10.1)


 


Neutrophils (%) (Auto)


  76.1 %


(45.0-75.0)  H


 


Lymphocytes (%) (Auto)


  13.5 %


(20.0-45.0)  L


 


Monocytes (%) (Auto)


  8.3 %


(1.0-10.0)


 


Eosinophils (%) (Auto)


  1.5 %


(0.0-3.0)


 


Basophils (%) (Auto)


  0.6 %


(0.0-2.0)


 


Prothrombin Time


  10.8 SEC


(9.30-11.50)


 


Prothromb Time International


Ratio 1.0 (0.9-1.1)  


 


 


Activated Partial


Thromboplast Time 28 SEC (23-33)


 


 


Sodium Level


  143 MMOL/L


(136-145)


 


Potassium Level


  4.7 MMOL/L


(3.5-5.1)


 


Chloride Level


  105 MMOL/L


()


 


Carbon Dioxide Level


  30 MMOL/L


(21-32)


 


Anion Gap


  8 mmol/L


(5-15)


 


Blood Urea Nitrogen


  17 mg/dL


(7-18)


 


Creatinine


  0.9 MG/DL


(0.55-1.30)


 


Estimat Glomerular Filtration


Rate > 60 mL/min


(>60)


 


Glucose Level


  91 MG/DL


()


 


Calcium Level


  8.8 MG/DL


(8.5-10.1)


 


Total Bilirubin


  0.6 MG/DL


(0.2-1.0)


 


Aspartate Amino Transf


(AST/SGOT) 48 U/L (15-37)


H


 


Alanine Aminotransferase


(ALT/SGPT) 23 U/L (12-78)


 


 


Alkaline Phosphatase


  99 U/L


()


 


Troponin I


  0.204 ng/mL


(0.000-0.056)


 


Total Protein


  8.0 G/DL


(6.4-8.2)


 


Albumin


  3.5 G/DL


(3.4-5.0)


 


Globulin 4.5 g/dL  


 


Albumin/Globulin Ratio


  0.8 (1.0-2.7)


Camilo Birmingham MD Nov 9, 2018 13:14

## 2018-11-10 VITALS — DIASTOLIC BLOOD PRESSURE: 78 MMHG | SYSTOLIC BLOOD PRESSURE: 138 MMHG

## 2018-11-10 VITALS — SYSTOLIC BLOOD PRESSURE: 126 MMHG | DIASTOLIC BLOOD PRESSURE: 38 MMHG

## 2018-11-10 VITALS — SYSTOLIC BLOOD PRESSURE: 131 MMHG | DIASTOLIC BLOOD PRESSURE: 49 MMHG

## 2018-11-10 VITALS — SYSTOLIC BLOOD PRESSURE: 121 MMHG | DIASTOLIC BLOOD PRESSURE: 45 MMHG

## 2018-11-10 VITALS — DIASTOLIC BLOOD PRESSURE: 4 MMHG

## 2018-11-10 VITALS — SYSTOLIC BLOOD PRESSURE: 116 MMHG | DIASTOLIC BLOOD PRESSURE: 38 MMHG

## 2018-11-10 VITALS — DIASTOLIC BLOOD PRESSURE: 55 MMHG | SYSTOLIC BLOOD PRESSURE: 118 MMHG

## 2018-11-10 VITALS — DIASTOLIC BLOOD PRESSURE: 50 MMHG | SYSTOLIC BLOOD PRESSURE: 91 MMHG

## 2018-11-10 VITALS — SYSTOLIC BLOOD PRESSURE: 134 MMHG | DIASTOLIC BLOOD PRESSURE: 49 MMHG

## 2018-11-10 VITALS — DIASTOLIC BLOOD PRESSURE: 58 MMHG | SYSTOLIC BLOOD PRESSURE: 154 MMHG

## 2018-11-10 VITALS — SYSTOLIC BLOOD PRESSURE: 127 MMHG | DIASTOLIC BLOOD PRESSURE: 77 MMHG

## 2018-11-10 VITALS — SYSTOLIC BLOOD PRESSURE: 120 MMHG | DIASTOLIC BLOOD PRESSURE: 77 MMHG

## 2018-11-10 VITALS — SYSTOLIC BLOOD PRESSURE: 125 MMHG | DIASTOLIC BLOOD PRESSURE: 51 MMHG

## 2018-11-10 VITALS — SYSTOLIC BLOOD PRESSURE: 119 MMHG | DIASTOLIC BLOOD PRESSURE: 34 MMHG

## 2018-11-10 VITALS — DIASTOLIC BLOOD PRESSURE: 54 MMHG | SYSTOLIC BLOOD PRESSURE: 137 MMHG

## 2018-11-10 VITALS — SYSTOLIC BLOOD PRESSURE: 115 MMHG | DIASTOLIC BLOOD PRESSURE: 34 MMHG

## 2018-11-10 VITALS — DIASTOLIC BLOOD PRESSURE: 65 MMHG | SYSTOLIC BLOOD PRESSURE: 147 MMHG

## 2018-11-10 VITALS — SYSTOLIC BLOOD PRESSURE: 95 MMHG | DIASTOLIC BLOOD PRESSURE: 49 MMHG

## 2018-11-10 VITALS — SYSTOLIC BLOOD PRESSURE: 144 MMHG | DIASTOLIC BLOOD PRESSURE: 71 MMHG

## 2018-11-10 VITALS — DIASTOLIC BLOOD PRESSURE: 45 MMHG | SYSTOLIC BLOOD PRESSURE: 127 MMHG

## 2018-11-10 VITALS — SYSTOLIC BLOOD PRESSURE: 130 MMHG | DIASTOLIC BLOOD PRESSURE: 66 MMHG

## 2018-11-10 VITALS — DIASTOLIC BLOOD PRESSURE: 57 MMHG | SYSTOLIC BLOOD PRESSURE: 145 MMHG

## 2018-11-10 VITALS — DIASTOLIC BLOOD PRESSURE: 55 MMHG | SYSTOLIC BLOOD PRESSURE: 121 MMHG

## 2018-11-10 VITALS — SYSTOLIC BLOOD PRESSURE: 109 MMHG | DIASTOLIC BLOOD PRESSURE: 41 MMHG

## 2018-11-10 LAB
ADD MANUAL DIFF: YES
ANION GAP SERPL CALC-SCNC: 11 MMOL/L (ref 5–15)
BUN SERPL-MCNC: 31 MG/DL (ref 7–18)
CALCIUM SERPL-MCNC: 9.4 MG/DL (ref 8.5–10.1)
CHLORIDE SERPL-SCNC: 101 MMOL/L (ref 98–107)
CHOLEST SERPL-MCNC: 120 MG/DL (ref ?–200)
CO2 SERPL-SCNC: 28 MMOL/L (ref 21–32)
CREAT SERPL-MCNC: 1.1 MG/DL (ref 0.55–1.3)
ERYTHROCYTE [DISTWIDTH] IN BLOOD BY AUTOMATED COUNT: 13.1 % (ref 11.6–14.8)
HCT VFR BLD CALC: 37.5 % (ref 42–52)
HDLC SERPL-MCNC: 29 MG/DL (ref 40–60)
HGB BLD-MCNC: 12.8 G/DL (ref 14.2–18)
MCV RBC AUTO: 78 FL (ref 80–99)
PHOSPHATE SERPL-MCNC: 3.6 MG/DL (ref 2.5–4.9)
PLATELET # BLD: 165 K/UL (ref 150–450)
POTASSIUM SERPL-SCNC: 4.4 MMOL/L (ref 3.5–5.1)
RBC # BLD AUTO: 4.82 M/UL (ref 4.7–6.1)
SODIUM SERPL-SCNC: 140 MMOL/L (ref 136–145)
TRIGL SERPL-MCNC: 55 MG/DL (ref 30–150)
WBC # BLD AUTO: 7.8 K/UL (ref 4.8–10.8)

## 2018-11-10 RX ADMIN — METOPROLOL TARTRATE SCH MG: 25 TABLET, FILM COATED ORAL at 09:00

## 2018-11-10 RX ADMIN — APIXABAN SCH MG: 2.5 TABLET, FILM COATED ORAL at 18:01

## 2018-11-10 RX ADMIN — ATORVASTATIN CALCIUM SCH MG: 20 TABLET, FILM COATED ORAL at 20:41

## 2018-11-10 RX ADMIN — APIXABAN SCH MG: 2.5 TABLET, FILM COATED ORAL at 09:18

## 2018-11-10 NOTE — PULMONOLGY CRITICAL CARE NOTE
Critical Care - Asmt/Plan


Assessment/Plan:


Patient is a 68-year-old male with a history of COPD/Asthma, HTN, atrial 

fibrillation on Eliquis, ptsd presents with spitting up blood from his mouth, 

he reports no pain complaints, but reports that he fell off a ladder yesterday, 

it was roughly 5 feet high, it tipped over and he fell onto a pile of junk in 

the closet, he did not hit his head or lose consciousness, and he didn't think 

much of it until this morning when he started noticing blood in his mouth.  He 

denies neck or back pain, vomiting, does report he's been bleeding from his 

arms from the fall yesterday but stopped temporarily and starts again so he 

finally came in for an evaluation. CT Head/neck no evidence fracture, IC 

pathology





Allergies:  


 


     AMPICILLIN (Verified  Allergy, Unknown, 8/3/18)  


     PENICILLIN (Allergy, Unknown, 8/3/18)








Reviewed Nursing Documentation:  PMH: Agreed; PSxH: Agreed





Nursing Documentation-PMH


Past Medical History:  No History, Except For


Hx Cardiac Problems:  Yes - bradycardia, Afib


Hx Hypertension:  Yes


Hx Pacemaker:  No


Hx Asthma:  Yes


Hx COPD:  Yes


Hx Diabetes:  No


Hx Cancer:  No


Hx Gastrointestinal Problems:  No


Hx Dialysis:  No


History Of Psychiatric Problem:  Yes - PTSD


Hx Neurological Problems:  No


Hx Cerebrovascular Accident:  No


Hx Seizures:  No





Review of Systems


All Other Systems:  negative except mentioned in HPI





Physical Exam





Vital Signs noted








General Appearance:  alert, non-toxic, mild distress


Head:  normocephalic


Eyes:  bilateral eye normal inspection, bilateral eye PERRL, bilateral eye EOMI


ENT:  normal ENT inspection, hearing grossly normal, normal pharynx, no 

angioedema, normal voice, moist mucus membranes, other - Multiple missing teeth

, dried blood and abrasions on the internal aspect of the upper and lower lip, 

no loose teeth


Neck:  normal inspection, full range of motion, supple, supple/symm/no masses


Respiratory:  chest non-tender, lungs clear, normal breath sounds, no rhonchi, 

speaking full sentences, wheezing, expiration, chest symmetrical, palpation of 

chest normal


Cardiovascular normal peripheral pulses, regular rate, rhythm, no edema, no 

gallop, no JVD, no murmur, no rub, tachycardia


Gastrointestinal:  normal inspection, non tender, soft, no mass, no guarding, 

no rebound


Rectal:  deferred


Genitourinary:  normal inspection, no CVA tenderness


Musculoskeletal:  back normal, gait/station normal, normal range of motion, non-

tender, no calf tenderness


Neurologic:  alert, responsive, CNs III-XII nml as tested, motor strength/tone 

normal, sensory intact, speech normal


Psychiatric:  judgement/insight normal, memory normal, mood/affect normal


Skin:  normal color, no rash, warm/dry, normal turgor, other - A few 1 cm Skin 

tears on bilateral Arms no lacerations, abrasions


Lymphatic:  no adenopathy





EKG Time:  10:02


EP Interpretation:  afib rate 107, no stemi


Rate:  tachycardiac


Rhythm:  other - afib


ST Segments:  no acute changes


ASA given to the pt in ED:  No





 


   Chest X-Ray: 


   Indication:  Shortness of Breath


   EP Interpretation:  Yes


   Interpretation:  no consolidation, no effusion, no pneumothorax, no acute 

cardiopulmonary disease


   Impression:  No acute disease








Impression:  


 


 COPD (chronic obstructive pulmonary disease)


 S/p Fall


 Skin tear, oral abrasions


 Abrasion


 Afibrillation


 H/o HTN





For COPD comntinue with nebulized treatments as well as steroids


Follow Troponin


Admit ICU


Serial Labs








Critical Care - Objective





Last 24 Hour Vital Signs








  Date Time  Temp Pulse Resp B/P (MAP) Pulse Ox O2 Delivery O2 Flow Rate FiO2


 


11/10/18 19:24  92 17  100 Nasal Cannula 2.0 28


 


11/10/18 19:14  90 20  100 Nasal Cannula 2.0 28


 


11/10/18 19:14  90 22   Nasal Cannula 2.0 28


 


11/10/18 19:00 98.0 108 27 154/58 (90) 96   


 


11/10/18 18:00 98.7 97 27 147/65 (92) 97   


 


11/10/18 17:00  105 24 130/66 (87) 97   


 


11/10/18 16:00  86 25 131/49 (76) 97   


 


11/10/18 16:00  112      


 


11/10/18 16:00      Nasal Cannula 2.0 


 


11/10/18 15:00  78 22 109/41 (63) 97   


 


11/10/18 14:00  92 26 134/49 (77) 96   


 


11/10/18 13:00  86 27 /4 96   


 


11/10/18 12:00  70      


 


11/10/18 12:00      Nasal Cannula 2.0 


 


11/10/18 12:00  82 24 121/45 (70) 99   


 


11/10/18 11:00  64 20 119/34 (62) 100   


 


11/10/18 10:00  63 20 126/38 (67) 100   


 


11/10/18 09:19    127/45    


 


11/10/18 09:00  75 20 127/45 (72) 94   


 


11/10/18 09:00  55      


 


11/10/18 08:00 98.8 53 18 116/38 (64) 97   


 


11/10/18 08:00      Nasal Cannula 2.0 


 


11/10/18 08:00  50      


 


11/10/18 07:55  55 22   Room Air  21


 


11/10/18 07:00  60 18 125/51 (75) 99   


 


11/10/18 06:00  49 18 127/77 (94) 99   


 


11/10/18 05:00  50 18 137/54 (81) 99   


 


11/10/18 04:00  49 20 118/55 (76) 99   


 


11/10/18 04:00      Nasal Cannula 2.0 


 


11/10/18 04:00  40      


 


11/10/18 03:00  46 18 115/34 (61) 99   


 


11/10/18 02:00 98.0 50 18 121/55 (77) 99   


 


11/10/18 01:00  47 20 102/45 (64) 100   


 


11/10/18 00:00  48      


 


11/10/18 00:00      Nasal Cannula 2.0 


 


11/10/18 00:00  48 20 105/44 (64) 99   


 


11/9/18 23:00  53 20 114/51 (72) 100   


 


11/9/18 22:04  71  121/52    


 


11/9/18 22:00  57 21 121/52 (75) 100   


 


11/9/18 22:00  99 23   Nasal Cannula 2.0 28


 


11/9/18 21:00  67 23 127/66 (86) 99   











Critical Care - Subjective


ROS Limited/Unobtainable:  No


FI02:  28


Sputum Amount:  Scant


I&O:











Intake and Output  


 


 11/9/18 11/10/18





 19:00 07:00


 


Intake Total 490 ml 263.750 ml


 


Output Total 275 ml 300 ml


 


Balance 215 ml -36.250 ml


 


  


 


Intake Oral 450 ml 200 ml


 


IV Total 40 ml 63.750 ml


 


Output Urine Total 275 ml 300 ml


 


# Bowel Movements  1

















Jonathan Allred MD Nov 10, 2018 20:15

## 2018-11-10 NOTE — GENERAL PROGRESS NOTE
Assessment/Plan


Problem List:  


(1) Atrial fibrillation with RVR


ICD Codes:  I48.91 - Unspecified atrial fibrillation


SNOMED:  520509094544260


(2) NSTEMI (non-ST elevated myocardial infarction)


ICD Codes:  I21.4 - Non-ST elevation (NSTEMI) myocardial infarction


SNOMED:  545231658


(3) CAD (coronary artery disease)


ICD Codes:  I25.10 - Atherosclerotic heart disease of native coronary artery 

without angina pectoris


SNOMED:  52035360


(4) CHF (congestive heart failure)


ICD Codes:  I50.9 - Heart failure, unspecified


SNOMED:  34938547


(5) COPD (chronic obstructive pulmonary disease)


ICD Codes:  J44.9 - Chronic obstructive pulmonary disease, unspecified


SNOMED:  68410435


(6) Tobacco abuse


ICD Codes:  Z72.0 - Tobacco use


SNOMED:  210708253


(7) Major depression


ICD Codes:  F32.9 - Major depressive disorder, single episode, unspecified


SNOMED:  279544696


Assessment/Plan


Afib RVR with NSTEMI and history of PCI s/p stent at [facility] 3/18/ history of 

CHF , SSS?


- appreciate reqs by  Cardiology Dr. Pardo , continue lopressor if HR 

tolerates 


- cardizem d/c'd at 3 am 


- no hep gtt at this time 


- cont asa, plavix, eliquis, BB, ACE-I, statin, 


- serial troponins and EKG 


- echo


- monitor BP goal Map >65 , HR < 110





COPD and current tobacco abuse 


- Pulmonology Consult: Dr. Chalino figueroa 


- patient unaware if he was on steroids, holding for now will monitor and 

discuss with pulmonology 


- nicotine patch 


- tobacco cessation education





Major Depression


- Psychiatry Consult: Dr. Medina 





Code Status


- spoke to patient regarding code status and clarified that patient would like 

to be FULL CODE. He mentions he was referred to a hospice program? but unsure 

why and was not explained well about his medical condition





DVT


- patient on sufficient anticoagulation





Dispo 


- pending 





I have spent 70 minutes regarding patient care, coordination and counseling. I 

have spent 30 minutes of critical care time with patient.





Subjective


Date patient seen:  Nov 10, 2018


Time patient seen:  12:00


ROS Limited/Unobtainable:  No


Allergies:  


Coded Allergies:  


     AMPICILLIN (Verified  Allergy, Unknown, 8/3/18)


Uncoded Allergies:  


     PENICILLIN (Allergy, Unknown, 8/3/18)


Subjective


patient denies chest pain, sob, fevers, chills, schrader, sob. Requests to be 

discharged with nicotine patch as it is the only medication that does not give 

him nightmares.





Objective





Last 24 Hour Vital Signs








  Date Time  Temp Pulse Resp B/P (MAP) Pulse Ox O2 Delivery O2 Flow Rate FiO2


 


11/10/18 11:00  64 20 119/34 (62) 100   


 


11/10/18 10:00  63 20 126/38 (67) 100   


 


11/10/18 09:19    127/45    


 


11/10/18 09:00  75 20 127/45 (72) 94   


 


11/10/18 09:00  55      


 


11/10/18 08:00 98.8 53 18 116/38 (64) 97   


 


11/10/18 08:00      Nasal Cannula 2.0 


 


11/10/18 08:00  50      


 


11/10/18 07:55  55 22   Room Air  21


 


11/10/18 07:00  60 18 125/51 (75) 99   


 


11/10/18 06:00  49 18 127/77 (94) 99   


 


11/10/18 05:00  50 18 137/54 (81) 99   


 


11/10/18 04:00  49 20 118/55 (76) 99   


 


11/10/18 04:00      Nasal Cannula 2.0 


 


11/10/18 04:00  40      


 


11/10/18 03:00  46 18 115/34 (61) 99   


 


11/10/18 02:00 98.0 50 18 121/55 (77) 99   


 


11/10/18 01:00  47 20 102/45 (64) 100   


 


11/10/18 00:00  48      


 


11/10/18 00:00      Nasal Cannula 2.0 


 


11/10/18 00:00  48 20 105/44 (64) 99   


 


11/9/18 23:00  53 20 114/51 (72) 100   


 


11/9/18 22:04  71  121/52    


 


11/9/18 22:00  57 21 121/52 (75) 100   


 


11/9/18 22:00  99 23   Nasal Cannula 2.0 28


 


11/9/18 21:00  67 23 127/66 (86) 99   


 


11/9/18 20:00      Nasal Cannula 2.0 


 


11/9/18 20:00  73      


 


11/9/18 20:00  73 24 130/45 (73) 99   


 


11/9/18 19:00  77 27 114/97 (103) 99   


 


11/9/18 18:00  87 26 158/86 (110) 97   


 


11/9/18 17:00  96 15 156/71 (99) 100   


 


11/9/18 16:00  89      


 


11/9/18 16:00 98.2 91 27 115/50 (71) 100   


 


11/9/18 16:00      Nasal Cannula 2.0 


 


11/9/18 15:00  105 24 166/94 (118) 100   


 


11/9/18 14:50  102  143/61    


 


11/9/18 14:00  109 25 141/71 (94) 100   


 


11/9/18 13:37      Nasal Cannula 2.0 


 


11/9/18 12:45 97.9 99 20 135/78 100 Nasal Cannula 2.0 28

















Intake and Output  


 


 11/9/18 11/10/18





 19:00 07:00


 


Intake Total 490 ml 263.750 ml


 


Output Total 275 ml 300 ml


 


Balance 215 ml -36.250 ml


 


  


 


Intake Oral 450 ml 200 ml


 


IV Total 40 ml 63.750 ml


 


Output Urine Total 275 ml 300 ml


 


# Bowel Movements  1








Laboratory Tests


11/9/18 13:45: 


D-Dimer 1.19H, Troponin I 0.425H


11/9/18 19:08: Troponin I 1.302H


11/10/18 04:55: 


Troponin I 2.485H, White Blood Count 7.8, Red Blood Count 4.82, Hemoglobin 12.8L

, Hematocrit 37.5L, Mean Corpuscular Volume 78L, Mean Corpuscular Hemoglobin 

26.5L, Mean Corpuscular Hemoglobin Concent 34.0, Red Cell Distribution Width 

13.1, Platelet Count 165, Mean Platelet Volume 8.4, Neutrophils (%) (Auto) , 

Lymphocytes (%) (Auto) , Monocytes (%) (Auto) , Eosinophils (%) (Auto) , 

Basophils (%) (Auto) , Differential Total Cells Counted 100, Neutrophils % (

Manual) 85H, Lymphocytes % (Manual) 8L, Monocytes % (Manual) 7, Eosinophils % (

Manual) 0, Basophils % (Manual) 0, Band Neutrophils 0, Platelet Estimate 

Adequate, Platelet Morphology Normal, Anisocytosis 1+, Microcytosis 1+, Sodium 

Level 140, Potassium Level 4.4, Chloride Level 101, Carbon Dioxide Level 28, 

Anion Gap 11, Blood Urea Nitrogen 31H, Creatinine 1.1, Estimat Glomerular 

Filtration Rate > 60, Glucose Level 176H, Calcium Level 9.4, Phosphorus Level 

3.6, Magnesium Level 1.9, Pro-B-Type Natriuretic Peptide 6951H, Triglycerides 

Level 55, Cholesterol Level 120, LDL Cholesterol 83, HDL Cholesterol 29L, 

Cholesterol/HDL Ratio 4.1


Height (Feet):  5


Height (Inches):  7.00


Weight (Pounds):  121


General Appearance:  WD/WN, no apparent distress, alert


EENT:  PERRL/EOMI, normal ENT inspection, other - scratch on nose from fall, no 

bleeding


Neck:  non-tender


Cardiovascular:  normal peripheral pulses, regularly irregular, no gallop/murmur

, no JVD


Respiratory/Chest:  chest wall non-tender, lungs clear, normal breath sounds, 

no respiratory distress, no accessory muscle use


Abdomen:  normal bowel sounds, non tender, soft, no organomegaly


Extremities:  normal range of motion, non-tender


Edema:  no edema noted Arm (L), no edema noted Arm (R), no edema noted Leg (L), 

no edema noted Leg (R), no edema noted Pedal (L), no edema noted Pedal (R), no 

edema noted Generalized


Neurologic:  CNs II-XII grossly normal, no motor/sensory deficits, oriented x 3

, normal mood/affect











Charity Don DO Nov 10, 2018 12:41

## 2018-11-10 NOTE — CARDIAC ELECTROPHYSIOLOGY PN
Assessment/Plan


Assessment/Plan





1. Elevated troponin and history of coronary artery


disease with prior stent placement three months ago at [facility].  


Continue aspirin and Lipitor 40 . Increase Lopressor to 100 bid . 


Could be due to atrial fib with RVR. Echo Nl EF 55%


Repeat troponin now in am.





2. Atrial fibrillation with rapid ventricular response.  DC Cardizem drip.  


Continue Eliquis. DC Cardizem. Increase Lopressor to 100 bid


3. Severe COPD and heavy smoking.





BHARGAV RN





Subjective


Subjective


In ICU. Cardizem drip DCed for bradycardia.No CP despite rising troponin.





Objective





Last 24 Hour Vital Signs








  Date Time  Temp Pulse Resp B/P (MAP) Pulse Ox O2 Delivery O2 Flow Rate FiO2


 


11/10/18 11:00  64 20 119/34 (62) 100   


 


11/10/18 10:00  63 20 126/38 (67) 100   


 


11/10/18 09:19    127/45    


 


11/10/18 09:00  75 20 127/45 (72) 94   


 


11/10/18 09:00  55      


 


11/10/18 08:00 98.8 53 18 116/38 (64) 97   


 


11/10/18 08:00      Nasal Cannula 2.0 


 


11/10/18 08:00  50      


 


11/10/18 07:55  55 22   Room Air  21


 


11/10/18 07:00  60 18 125/51 (75) 99   


 


11/10/18 06:00  49 18 127/77 (94) 99   


 


11/10/18 05:00  50 18 137/54 (81) 99   


 


11/10/18 04:00  49 20 118/55 (76) 99   


 


11/10/18 04:00      Nasal Cannula 2.0 


 


11/10/18 04:00  40      


 


11/10/18 03:00  46 18 115/34 (61) 99   


 


11/10/18 02:00 98.0 50 18 121/55 (77) 99   


 


11/10/18 01:00  47 20 102/45 (64) 100   


 


11/10/18 00:00  48      


 


11/10/18 00:00      Nasal Cannula 2.0 


 


11/10/18 00:00  48 20 105/44 (64) 99   


 


11/9/18 23:00  53 20 114/51 (72) 100   


 


11/9/18 22:04  71  121/52    


 


11/9/18 22:00  57 21 121/52 (75) 100   


 


11/9/18 22:00  99 23   Nasal Cannula 2.0 28


 


11/9/18 21:00  67 23 127/66 (86) 99   


 


11/9/18 20:00      Nasal Cannula 2.0 


 


11/9/18 20:00  73      


 


11/9/18 20:00  73 24 130/45 (73) 99   


 


11/9/18 19:00  77 27 114/97 (103) 99   


 


11/9/18 18:00  87 26 158/86 (110) 97   


 


11/9/18 17:00  96 15 156/71 (99) 100   


 


11/9/18 16:00  89      


 


11/9/18 16:00 98.2 91 27 115/50 (71) 100   


 


11/9/18 16:00      Nasal Cannula 2.0 


 


11/9/18 15:00  105 24 166/94 (118) 100   


 


11/9/18 14:50  102  143/61    


 


11/9/18 14:00  109 25 141/71 (94) 100   


 


11/9/18 13:37      Nasal Cannula 2.0 


 


11/9/18 12:45 97.9 99 20 135/78 100 Nasal Cannula 2.0 28


 


11/9/18 12:30 97.9 99 20 135/78 100 Nasal Cannula 2.0 28

















Intake and Output  


 


 11/9/18 11/10/18





 19:00 07:00


 


Intake Total 490 ml 263.750 ml


 


Output Total 275 ml 300 ml


 


Balance 215 ml -36.250 ml


 


  


 


Intake Oral 450 ml 200 ml


 


IV Total 40 ml 63.750 ml


 


Output Urine Total 275 ml 300 ml


 


# Bowel Movements  1











Laboratory Tests








Test


  11/9/18


13:45 11/9/18


19:08 11/10/18


04:55


 


D-Dimer


  1.19 mg/L FEU


(0.00-0.49)  H 


  


 


 


Troponin I


  0.425 ng/mL


(0.000-0.056) 1.302 ng/mL


(0.000-0.056) 2.485 ng/mL


(0.000-0.056)


 


White Blood Count


  


  


  7.8 K/UL


(4.8-10.8)


 


Red Blood Count


  


  


  4.82 M/UL


(4.70-6.10)


 


Hemoglobin


  


  


  12.8 G/DL


(14.2-18.0)  L


 


Hematocrit


  


  


  37.5 %


(42.0-52.0)  L


 


Mean Corpuscular Volume


  


  


  78 FL (80-99)


L


 


Mean Corpuscular Hemoglobin


  


  


  26.5 PG


(27.0-31.0)  L


 


Mean Corpuscular Hemoglobin


Concent 


  


  34.0 G/DL


(32.0-36.0)


 


Red Cell Distribution Width


  


  


  13.1 %


(11.6-14.8)


 


Platelet Count


  


  


  165 K/UL


(150-450)


 


Mean Platelet Volume


  


  


  8.4 FL


(6.5-10.1)


 


Neutrophils (%) (Auto)


  


  


  % (45.0-75.0)


 


 


Lymphocytes (%) (Auto)


  


  


  % (20.0-45.0)


 


 


Monocytes (%) (Auto)    % (1.0-10.0)  


 


Eosinophils (%) (Auto)    % (0.0-3.0)  


 


Basophils (%) (Auto)    % (0.0-2.0)  


 


Differential Total Cells


Counted 


  


  100  


 


 


Neutrophils % (Manual)   85 % (45-75)  H


 


Lymphocytes % (Manual)   8 % (20-45)  L


 


Monocytes % (Manual)   7 % (1-10)  


 


Eosinophils % (Manual)   0 % (0-3)  


 


Basophils % (Manual)   0 % (0-2)  


 


Band Neutrophils   0 % (0-8)  


 


Platelet Estimate   Adequate  


 


Platelet Morphology   Normal  


 


Anisocytosis   1+  


 


Microcytosis   1+  


 


Sodium Level


  


  


  140 MMOL/L


(136-145)


 


Potassium Level


  


  


  4.4 MMOL/L


(3.5-5.1)


 


Chloride Level


  


  


  101 MMOL/L


()


 


Carbon Dioxide Level


  


  


  28 MMOL/L


(21-32)


 


Anion Gap


  


  


  11 mmol/L


(5-15)


 


Blood Urea Nitrogen


  


  


  31 mg/dL


(7-18)  H


 


Creatinine


  


  


  1.1 MG/DL


(0.55-1.30)


 


Estimat Glomerular Filtration


Rate 


  


  > 60 mL/min


(>60)


 


Glucose Level


  


  


  176 MG/DL


()  H


 


Calcium Level


  


  


  9.4 MG/DL


(8.5-10.1)


 


Phosphorus Level


  


  


  3.6 MG/DL


(2.5-4.9)


 


Magnesium Level


  


  


  1.9 MG/DL


(1.8-2.4)


 


Pro-B-Type Natriuretic Peptide


  


  


  6951 pg/mL


(0-125)  H


 


Triglycerides Level


  


  


  55 MG/DL


()


 


Cholesterol Level


  


  


  120 MG/DL (<


200)


 


LDL Cholesterol


  


  


  83 mg/dL


(<100)


 


HDL Cholesterol


  


  


  29 MG/DL


(40-60)  L


 


Cholesterol/HDL Ratio   4.1 (3.3-4.4)  








Objective





HEAD AND NECK:  Shows no JVD.


LUNGS:  Coarse rhonchi.


CARDIOVASCULAR:  Irregularly irregular.  S1 and S2 with no gallop or


murmur.


ABDOMEN:  Soft.


EXTREMITIES:  No pitting edema.  There is diffuse ecchymoses.











Camilo Pardo MD Nov 10, 2018 12:26

## 2018-11-11 VITALS — DIASTOLIC BLOOD PRESSURE: 50 MMHG | SYSTOLIC BLOOD PRESSURE: 123 MMHG

## 2018-11-11 VITALS — DIASTOLIC BLOOD PRESSURE: 93 MMHG | SYSTOLIC BLOOD PRESSURE: 156 MMHG

## 2018-11-11 VITALS — SYSTOLIC BLOOD PRESSURE: 121 MMHG | DIASTOLIC BLOOD PRESSURE: 66 MMHG

## 2018-11-11 VITALS — SYSTOLIC BLOOD PRESSURE: 123 MMHG | DIASTOLIC BLOOD PRESSURE: 61 MMHG

## 2018-11-11 VITALS — SYSTOLIC BLOOD PRESSURE: 136 MMHG | DIASTOLIC BLOOD PRESSURE: 69 MMHG

## 2018-11-11 VITALS — DIASTOLIC BLOOD PRESSURE: 87 MMHG | SYSTOLIC BLOOD PRESSURE: 172 MMHG

## 2018-11-11 VITALS — DIASTOLIC BLOOD PRESSURE: 48 MMHG | SYSTOLIC BLOOD PRESSURE: 124 MMHG

## 2018-11-11 VITALS — DIASTOLIC BLOOD PRESSURE: 64 MMHG | SYSTOLIC BLOOD PRESSURE: 125 MMHG

## 2018-11-11 VITALS — SYSTOLIC BLOOD PRESSURE: 124 MMHG | DIASTOLIC BLOOD PRESSURE: 47 MMHG

## 2018-11-11 VITALS — SYSTOLIC BLOOD PRESSURE: 122 MMHG | DIASTOLIC BLOOD PRESSURE: 63 MMHG

## 2018-11-11 LAB
ALBUMIN SERPL-MCNC: 3.1 G/DL (ref 3.4–5)
ALBUMIN/GLOB SERPL: 0.8 {RATIO} (ref 1–2.7)
ALP SERPL-CCNC: 78 U/L (ref 46–116)
ALT SERPL-CCNC: 27 U/L (ref 12–78)
ANION GAP SERPL CALC-SCNC: 5 MMOL/L (ref 5–15)
AST SERPL-CCNC: 35 U/L (ref 15–37)
BILIRUB SERPL-MCNC: 0.3 MG/DL (ref 0.2–1)
BUN SERPL-MCNC: 29 MG/DL (ref 7–18)
CALCIUM SERPL-MCNC: 9 MG/DL (ref 8.5–10.1)
CHLORIDE SERPL-SCNC: 105 MMOL/L (ref 98–107)
CO2 SERPL-SCNC: 30 MMOL/L (ref 21–32)
CREAT SERPL-MCNC: 1 MG/DL (ref 0.55–1.3)
GLOBULIN SER-MCNC: 3.9 G/DL
POTASSIUM SERPL-SCNC: 4.5 MMOL/L (ref 3.5–5.1)
SODIUM SERPL-SCNC: 140 MMOL/L (ref 136–145)

## 2018-11-11 RX ADMIN — APIXABAN SCH MG: 2.5 TABLET, FILM COATED ORAL at 17:25

## 2018-11-11 RX ADMIN — METOPROLOL TARTRATE SCH MG: 25 TABLET, FILM COATED ORAL at 20:35

## 2018-11-11 RX ADMIN — METOPROLOL TARTRATE SCH MG: 25 TABLET, FILM COATED ORAL at 09:33

## 2018-11-11 RX ADMIN — ASPIRIN 81 MG SCH MG: 81 TABLET ORAL at 09:00

## 2018-11-11 RX ADMIN — ATORVASTATIN CALCIUM SCH MG: 20 TABLET, FILM COATED ORAL at 20:34

## 2018-11-11 RX ADMIN — APIXABAN SCH MG: 2.5 TABLET, FILM COATED ORAL at 09:32

## 2018-11-11 NOTE — CARDIAC ELECTROPHYSIOLOGY PN
Assessment/Plan


Assessment/Plan





1. Elevated troponin and history of coronary artery


disease with prior stent placement three months ago at [facility].  


Continue Plavix, Lipitor 40, Lopressor  100 bid . Refuses aspirin


Could be due to atrial fib with RVR. Echo Nl EF 55% Levels are flat


Schedule Lexiscan Cardiolite in am.





2. Atrial fibrillation with rapid ventricular response.   


Continue Eliquis, Lopressor to 100 bid





3. Severe COPD and heavy smoking.





DW RN





Subjective


Subjective


Transferred out of  ICU this am.  No CP despite rising troponin. In 60s in fib





Objective





Last 24 Hour Vital Signs








  Date Time  Temp Pulse Resp B/P (MAP) Pulse Ox O2 Delivery O2 Flow Rate FiO2


 


11/11/18 09:33  63  123/50    


 


11/11/18 08:10      Room Air  


 


11/11/18 07:57  61      


 


11/11/18 06:00  63 20 123/50 (74) 96   


 


11/11/18 05:00  57 22 124/48 (73) 95   


 


11/11/18 04:00      Nasal Cannula 2.0 


 


11/11/18 04:00 98.1 57 17 124/47 (72) 98   


 


11/11/18 04:00  57      


 


11/11/18 03:00  59 17 121/66 (84) 98   


 


11/11/18 02:00  60 24 125/64 (84) 98   


 


11/11/18 01:00  65 26 123/61 (81) 68   


 


11/11/18 00:00      Nasal Cannula 2.0 


 


11/11/18 00:00  68 18 136/69 (91) 99   


 


11/11/18 00:00  71      


 


11/10/18 23:00  74 21 120/77 (91) 97   


 


11/10/18 22:00  77 26 138/78 (98) 99   


 


11/10/18 21:00  70 26 144/71 (95) 97   


 


11/10/18 20:41  90  148/90    


 


11/10/18 20:00  90      


 


11/10/18 20:00  95 29 145/57 (86) 97   


 


11/10/18 20:00      Nasal Cannula 2.0 


 


11/10/18 19:24  92 17  100 Nasal Cannula 2.0 28


 


11/10/18 19:14  90 20  100 Nasal Cannula 2.0 28


 


11/10/18 19:14  90 22   Nasal Cannula 2.0 28


 


11/10/18 19:00 98.0 108 27 154/58 (90) 96   


 


11/10/18 18:00 98.7 97 27 147/65 (92) 97   


 


11/10/18 17:00  105 24 130/66 (87) 97   


 


11/10/18 16:00  86 25 131/49 (76) 97   


 


11/10/18 16:00  112      


 


11/10/18 16:00      Nasal Cannula 2.0 


 


11/10/18 15:00  78 22 109/41 (63) 97   


 


11/10/18 14:00  92 26 134/49 (77) 96   

















Intake and Output  


 


 11/10/18 11/11/18





 19:00 07:00


 


Intake Total  30 ml


 


Output Total 1300 ml 600 ml


 


Balance -1300 ml -570 ml


 


  


 


Intake Oral  30 ml


 


Output Urine Total 1300 ml 600 ml











Laboratory Tests








Test


  11/11/18


04:48 11/11/18


13:07


 


Sodium Level


  140 MMOL/L


(136-145) 


 


 


Potassium Level


  4.5 MMOL/L


(3.5-5.1) 


 


 


Chloride Level


  105 MMOL/L


() 


 


 


Carbon Dioxide Level


  30 MMOL/L


(21-32) 


 


 


Anion Gap


  5 mmol/L


(5-15) 


 


 


Blood Urea Nitrogen


  29 mg/dL


(7-18)  H 


 


 


Creatinine


  1.0 MG/DL


(0.55-1.30) 


 


 


Estimat Glomerular Filtration


Rate > 60 mL/min


(>60) 


 


 


Glucose Level


  104 MG/DL


() 


 


 


Calcium Level


  9.0 MG/DL


(8.5-10.1) 


 


 


Total Bilirubin


  0.3 MG/DL


(0.2-1.0) 


 


 


Aspartate Amino Transf


(AST/SGOT) 35 U/L (15-37)


  


 


 


Alanine Aminotransferase


(ALT/SGPT) 27 U/L (12-78)


  


 


 


Alkaline Phosphatase


  78 U/L


() 


 


 


Troponin I


  1.239 ng/mL


(0.000-0.056) Pending  


 


 


Total Protein


  7.0 G/DL


(6.4-8.2) 


 


 


Albumin


  3.1 G/DL


(3.4-5.0)  L 


 


 


Globulin 3.9 g/dL   


 


Albumin/Globulin Ratio


  0.8 (1.0-2.7)


L 


 








Objective





HEAD AND NECK:  Shows no JVD.


LUNGS:  Coarse rhonchi.


CARDIOVASCULAR:  Irregularly irregular.  S1 and S2 with no gallop or


murmur.


ABDOMEN:  Soft.


EXTREMITIES:  No pitting edema.  There is diffuse ecchymoses.











Camilo Pardo MD Nov 11, 2018 13:44

## 2018-11-11 NOTE — DIAGNOSTIC IMAGING REPORT
EXAM:

  CT Angiography Chest With Intravenous Contrast

 

CLINICAL HISTORY:

  TACHYP

 

TECHNIQUE:

  Axial computed tomographic angiography images of the chest with 

intravenous contrast using pulmonary embolism protocol.  CTDI is 15 mGy 

and DLP is 545 mGy-cm.  One or more of the following dose reduction 

techniques were used: automated exposure control, adjustment of the mA 

and/or kV according to patient size, use of iterative reconstruction 

technique.

  3D and MIP reconstructed images were created and reviewed.

 

COMPARISON:

  No relevant prior studies available.

 

FINDINGS:

  Limitations:  Limited due to motion.

  Pulmonary arteries:  No definite PE or aortic dissection.

  Aorta:  Atherosclerosis.  No aortic dissection.

  Great vessels of aortic arch:  Stenosis at proximal left subclavian 

artery.

  Lungs:  Possible minimal pulmonary edema/infiltrates, versus motion 

artifact.

  Pleural space:  Unremarkable.  No significant effusion.  No 

pneumothorax.

  Heart:  Unremarkable.  No cardiomegaly.  No significant pericardial 

effusion.

  Bones/joints:  No acute fracture.

  Soft tissues:  Unremarkable.

  Lymph nodes:  Unremarkable.  No enlarged lymph nodes.

 

IMPRESSION:     

1.  Limited due to motion.

2.  No definite PE or aortic dissection.

3.  Possible minimal pulmonary edema/infiltrates, versus motion artifact.

## 2018-11-11 NOTE — PULMONOLGY CRITICAL CARE NOTE
Critical Care - Asmt/Plan


Assessment/Plan:


Patient is a 68-year-old male with a history of COPD/Asthma, HTN, atrial 

fibrillation on Eliquis, ptsd presents with spitting up blood from his mouth, 

he reports no pain complaints, but reports that he fell off a ladder yesterday, 

it was roughly 5 feet high, it tipped over and he fell onto a pile of junk in 

the closet, he did not hit his head or lose consciousness, and he didn't think 

much of it until this morning when he started noticing blood in his mouth.  He 

denies neck or back pain, vomiting, does report he's been bleeding from his 

arms from the fall yesterday but stopped temporarily and starts again so he 

finally came in for an evaluation. CT Head/neck no evidence fracture, IC 

pathology





Allergies:  


 


     AMPICILLIN (Verified  Allergy, Unknown, 8/3/18)  


     PENICILLIN (Allergy, Unknown, 8/3/18)








Reviewed Nursing Documentation:  PMH: Agreed; PSxH: Agreed





Nursing Documentation-PMH


Past Medical History:  No History, Except For


Hx Cardiac Problems:  Yes - bradycardia, Afib


Hx Hypertension:  Yes


Hx Pacemaker:  No


Hx Asthma:  Yes


Hx COPD:  Yes


Hx Diabetes:  No


Hx Cancer:  No


Hx Gastrointestinal Problems:  No


Hx Dialysis:  No


History Of Psychiatric Problem:  Yes - PTSD


Hx Neurological Problems:  No


Hx Cerebrovascular Accident:  No


Hx Seizures:  No





Review of Systems


All Other Systems:  negative except mentioned in HPI





Physical Exam





Vital Signs noted








General Appearance:  alert, non-toxic, mild distress


Head:  normocephalic


Eyes:  bilateral eye normal inspection, bilateral eye PERRL, bilateral eye EOMI


ENT:  normal ENT inspection, hearing grossly normal, normal pharynx, no 

angioedema, normal voice, moist mucus membranes, other - Multiple missing teeth

, dried blood and abrasions on the internal aspect of the upper and lower lip, 

no loose teeth


Neck:  normal inspection, full range of motion, supple, supple/symm/no masses


Respiratory:  chest non-tender, lungs clear, normal breath sounds, no rhonchi, 

speaking full sentences, wheezing, expiration, chest symmetrical, palpation of 

chest normal


Cardiovascular normal peripheral pulses, regular rate, rhythm, no edema, no 

gallop, no JVD, no murmur, no rub, tachycardia


Gastrointestinal:  normal inspection, non tender, soft, no mass, no guarding, 

no rebound


Rectal:  deferred


Genitourinary:  normal inspection, no CVA tenderness


Musculoskeletal:  back normal, gait/station normal, normal range of motion, non-

tender, no calf tenderness


Neurologic:  alert, responsive, CNs III-XII nml as tested, motor strength/tone 

normal, sensory intact, speech normal


Psychiatric:  judgement/insight normal, memory normal, mood/affect normal


Skin:  normal color, no rash, warm/dry, normal turgor, other - A few 1 cm Skin 

tears on bilateral Arms no lacerations, abrasions


Lymphatic:  no adenopathy





EKG Time:  10:02


EP Interpretation:  afib rate 107, no stemi


Rate:  tachycardiac


Rhythm:  other - afib


ST Segments:  no acute changes


ASA given to the pt in ED:  No





 


   Chest X-Ray: 


   Indication:  Shortness of Breath


   EP Interpretation:  Yes


   Interpretation:  no consolidation, no effusion, no pneumothorax, no acute 

cardiopulmonary disease


   Impression:  No acute disease








Impression:  


 


 COPD (chronic obstructive pulmonary disease)


 S/p Fall


 Skin tear, oral abrasions


 Abrasion


 Afibrillation


 H/o HTN





For COPD comntinue with nebulized treatments as well as steroids


Follow Troponin


Admit ICU


Serial Labs








Critical Care - Objective





Last 24 Hour Vital Signs








  Date Time  Temp Pulse Resp B/P (MAP) Pulse Ox O2 Delivery O2 Flow Rate FiO2


 


11/11/18 09:33  63  123/50    


 


11/11/18 08:10      Room Air  


 


11/11/18 07:57  61      


 


11/11/18 06:00  63 20 123/50 (74) 96   


 


11/11/18 05:00  57 22 124/48 (73) 95   


 


11/11/18 04:00      Nasal Cannula 2.0 


 


11/11/18 04:00 98.1 57 17 124/47 (72) 98   


 


11/11/18 04:00  57      


 


11/11/18 03:00  59 17 121/66 (84) 98   


 


11/11/18 02:00  60 24 125/64 (84) 98   


 


11/11/18 01:00  65 26 123/61 (81) 68   


 


11/11/18 00:00      Nasal Cannula 2.0 


 


11/11/18 00:00  68 18 136/69 (91) 99   


 


11/11/18 00:00  71      


 


11/10/18 23:00  74 21 120/77 (91) 97   


 


11/10/18 22:00  77 26 138/78 (98) 99   


 


11/10/18 21:00  70 26 144/71 (95) 97   


 


11/10/18 20:41  90  148/90    


 


11/10/18 20:00  90      


 


11/10/18 20:00  95 29 145/57 (86) 97   


 


11/10/18 20:00      Nasal Cannula 2.0 


 


11/10/18 19:24  92 17  100 Nasal Cannula 2.0 28


 


11/10/18 19:14  90 20  100 Nasal Cannula 2.0 28


 


11/10/18 19:14  90 22   Nasal Cannula 2.0 28


 


11/10/18 19:00 98.0 108 27 154/58 (90) 96   


 


11/10/18 18:00 98.7 97 27 147/65 (92) 97   


 


11/10/18 17:00  105 24 130/66 (87) 97   


 


11/10/18 16:00  86 25 131/49 (76) 97   


 


11/10/18 16:00  112      


 


11/10/18 16:00      Nasal Cannula 2.0 


 


11/10/18 15:00  78 22 109/41 (63) 97   


 


11/10/18 14:00  92 26 134/49 (77) 96   


 


11/10/18 13:00  86 27 /4 96   


 


11/10/18 12:00  70      


 


11/10/18 12:00      Nasal Cannula 2.0 


 


11/10/18 12:00  82 24 121/45 (70) 99   











Critical Care - Subjective


ROS Limited/Unobtainable:  No


FI02:  28


Sputum Amount:  Scant


I&O:











Intake and Output  


 


 11/10/18 11/11/18





 19:00 07:00


 


Intake Total  30 ml


 


Output Total 1300 ml 600 ml


 


Balance -1300 ml -570 ml


 


  


 


Intake Oral  30 ml


 


Output Urine Total 1300 ml 600 ml

















Jonathan Allred MD Nov 11, 2018 11:45

## 2018-11-11 NOTE — GENERAL PROGRESS NOTE
Assessment/Plan


Problem List:  


(1) Atrial fibrillation with RVR


ICD Codes:  I48.91 - Unspecified atrial fibrillation


SNOMED:  104332000275839


(2) NSTEMI (non-ST elevated myocardial infarction)


ICD Codes:  I21.4 - Non-ST elevation (NSTEMI) myocardial infarction


SNOMED:  269781524


(3) CAD (coronary artery disease)


ICD Codes:  I25.10 - Atherosclerotic heart disease of native coronary artery 

without angina pectoris


SNOMED:  83356841


(4) CHF (congestive heart failure)


ICD Codes:  I50.9 - Heart failure, unspecified


SNOMED:  55706206


(5) COPD (chronic obstructive pulmonary disease)


ICD Codes:  J44.9 - Chronic obstructive pulmonary disease, unspecified


SNOMED:  98708786


(6) Tobacco abuse


ICD Codes:  Z72.0 - Tobacco use


SNOMED:  536997429


(7) Major depression


ICD Codes:  F32.9 - Major depressive disorder, single episode, unspecified


SNOMED:  819472301


Assessment/Plan


Afib RVR with NSTEMI and history of PCI s/p stent at [facility] 3/18/ history of 

CHF , SSS?


- appreciate reqs by  Cardiology Dr. Pardo , continue lopressor if HR 

tolerates 


- elevated troponins 2/2 troponin leak


- cont asa, plavix, eliquis, BB, ACE-I, statin, 


- serial troponins and EKG 


- echo


- monitor BP goal Map >65 , HR < 110





Elevated dimer and tachycardia


- CTA to r/o PE 


- IVF mateo-contrast to avoid JACINTO





COPD and current tobacco abuse 


- Pulmonology Consult: Dr. Chalino figueroa 


- patient unaware if he was on steroids, holding for now will monitor and 

discuss with pulmonology 


- nicotine patch 


- tobacco cessation education





Major Depression


- Psychiatry Consult: Dr. Medina 





Code Status


- spoke to patient regarding code status and clarified that patient would like 

to be FULL CODE. He mentions he was referred to a hospice program? but unsure 

why and was not explained well about his medical condition





DVT


- patient on sufficient anticoagulation





Dispo 


- pending 





I have spent 70 minutes regarding patient care, coordination and counseling.





Subjective


Date patient seen:  Nov 11, 2018


Time patient seen:  12:00


Allergies:  


Coded Allergies:  


     AMPICILLIN (Verified  Allergy, Unknown, 8/3/18)


Uncoded Allergies:  


     PENICILLIN (Allergy, Unknown, 8/3/18)


Subjective


patient denies chest pain, sob, fevers, chills, schrader, sob.





Objective





Last 24 Hour Vital Signs








  Date Time  Temp Pulse Resp B/P (MAP) Pulse Ox O2 Delivery O2 Flow Rate FiO2


 


11/11/18 09:33  63  123/50    


 


11/11/18 08:10      Room Air  


 


11/11/18 07:57  61      


 


11/11/18 06:00  63 20 123/50 (74) 96   


 


11/11/18 05:00  57 22 124/48 (73) 95   


 


11/11/18 04:00      Nasal Cannula 2.0 


 


11/11/18 04:00 98.1 57 17 124/47 (72) 98   


 


11/11/18 04:00  57      


 


11/11/18 03:00  59 17 121/66 (84) 98   


 


11/11/18 02:00  60 24 125/64 (84) 98   


 


11/11/18 01:00  65 26 123/61 (81) 68   


 


11/11/18 00:00      Nasal Cannula 2.0 


 


11/11/18 00:00  68 18 136/69 (91) 99   


 


11/11/18 00:00  71      


 


11/10/18 23:00  74 21 120/77 (91) 97   


 


11/10/18 22:00  77 26 138/78 (98) 99   


 


11/10/18 21:00  70 26 144/71 (95) 97   


 


11/10/18 20:41  90  148/90    


 


11/10/18 20:00  90      


 


11/10/18 20:00  95 29 145/57 (86) 97   


 


11/10/18 20:00      Nasal Cannula 2.0 


 


11/10/18 19:24  92 17  100 Nasal Cannula 2.0 28


 


11/10/18 19:14  90 20  100 Nasal Cannula 2.0 28


 


11/10/18 19:14  90 22   Nasal Cannula 2.0 28


 


11/10/18 19:00 98.0 108 27 154/58 (90) 96   


 


11/10/18 18:00 98.7 97 27 147/65 (92) 97   


 


11/10/18 17:00  105 24 130/66 (87) 97   


 


11/10/18 16:00  86 25 131/49 (76) 97   


 


11/10/18 16:00  112      


 


11/10/18 16:00      Nasal Cannula 2.0 


 


11/10/18 15:00  78 22 109/41 (63) 97   


 


11/10/18 14:00  92 26 134/49 (77) 96   


 


11/10/18 13:00  86 27 /4 96   

















Intake and Output  


 


 11/10/18 11/11/18





 19:00 07:00


 


Intake Total  30 ml


 


Output Total 1300 ml 600 ml


 


Balance -1300 ml -570 ml


 


  


 


Intake Oral  30 ml


 


Output Urine Total 1300 ml 600 ml








Laboratory Tests


11/10/18 13:16: Troponin I 1.329H


11/11/18 04:48: 


Troponin I 1.239H, Sodium Level 140, Potassium Level 4.5, Chloride Level 105, 

Carbon Dioxide Level 30, Anion Gap 5, Blood Urea Nitrogen 29H, Creatinine 1.0, 

Estimat Glomerular Filtration Rate > 60, Glucose Level 104, Calcium Level 9.0, 

Total Bilirubin 0.3, Aspartate Amino Transf (AST/SGOT) 35, Alanine 

Aminotransferase (ALT/SGPT) 27, Alkaline Phosphatase 78, Total Protein 7.0, 

Albumin 3.1L, Globulin 3.9, Albumin/Globulin Ratio 0.8L


Height (Feet):  5


Height (Inches):  7.00


Weight (Pounds):  123


General Appearance:  WD/WN, no apparent distress, alert


EENT:  PERRL/EOMI, normal ENT inspection


Neck:  non-tender, normal alignment, supple, normal inspection


Cardiovascular:  normal peripheral pulses, regularly irregular, no gallop/murmur

, no JVD


Respiratory/Chest:  chest wall non-tender, lungs clear, normal breath sounds, 

no respiratory distress, no accessory muscle use, respiratory distress


Abdomen:  normal bowel sounds, non tender, soft, no organomegaly, no mass


Extremities:  normal range of motion, non-tender


Edema:  no edema noted Arm (L), no edema noted Arm (R), no edema noted Leg (L), 

no edema noted Leg (R), no edema noted Pedal (L), no edema noted Pedal (R), no 

edema noted Generalized


Neurologic:  CNs II-XII grossly normal, no motor/sensory deficits, oriented x 3

, normal mood/affect


Skin:  normal pigmentation, warm/dry











Charity Don DO Nov 11, 2018 12:41

## 2018-11-12 VITALS — DIASTOLIC BLOOD PRESSURE: 59 MMHG | SYSTOLIC BLOOD PRESSURE: 120 MMHG

## 2018-11-12 VITALS — DIASTOLIC BLOOD PRESSURE: 95 MMHG | SYSTOLIC BLOOD PRESSURE: 136 MMHG

## 2018-11-12 VITALS — DIASTOLIC BLOOD PRESSURE: 53 MMHG | SYSTOLIC BLOOD PRESSURE: 142 MMHG

## 2018-11-12 VITALS — SYSTOLIC BLOOD PRESSURE: 154 MMHG | DIASTOLIC BLOOD PRESSURE: 70 MMHG

## 2018-11-12 VITALS — SYSTOLIC BLOOD PRESSURE: 150 MMHG | DIASTOLIC BLOOD PRESSURE: 76 MMHG

## 2018-11-12 VITALS — SYSTOLIC BLOOD PRESSURE: 149 MMHG | DIASTOLIC BLOOD PRESSURE: 80 MMHG

## 2018-11-12 LAB
ADD MANUAL DIFF: NO
ANION GAP SERPL CALC-SCNC: 7 MMOL/L (ref 5–15)
BASOPHILS NFR BLD AUTO: 0.8 % (ref 0–2)
BUN SERPL-MCNC: 17 MG/DL (ref 7–18)
CALCIUM SERPL-MCNC: 8.6 MG/DL (ref 8.5–10.1)
CHLORIDE SERPL-SCNC: 106 MMOL/L (ref 98–107)
CO2 SERPL-SCNC: 28 MMOL/L (ref 21–32)
CREAT SERPL-MCNC: 0.8 MG/DL (ref 0.55–1.3)
EOSINOPHIL NFR BLD AUTO: 1.8 % (ref 0–3)
ERYTHROCYTE [DISTWIDTH] IN BLOOD BY AUTOMATED COUNT: 13.7 % (ref 11.6–14.8)
HCT VFR BLD CALC: 39.7 % (ref 42–52)
HGB BLD-MCNC: 13 G/DL (ref 14.2–18)
LYMPHOCYTES NFR BLD AUTO: 13.9 % (ref 20–45)
MCV RBC AUTO: 79 FL (ref 80–99)
MONOCYTES NFR BLD AUTO: 10 % (ref 1–10)
NEUTROPHILS NFR BLD AUTO: 73.6 % (ref 45–75)
PHOSPHATE SERPL-MCNC: 3.7 MG/DL (ref 2.5–4.9)
PLATELET # BLD: 126 K/UL (ref 150–450)
POTASSIUM SERPL-SCNC: 4.7 MMOL/L (ref 3.5–5.1)
RBC # BLD AUTO: 5.05 M/UL (ref 4.7–6.1)
SODIUM SERPL-SCNC: 141 MMOL/L (ref 136–145)
WBC # BLD AUTO: 7.2 K/UL (ref 4.8–10.8)

## 2018-11-12 RX ADMIN — APIXABAN SCH MG: 2.5 TABLET, FILM COATED ORAL at 11:15

## 2018-11-12 RX ADMIN — APIXABAN SCH MG: 2.5 TABLET, FILM COATED ORAL at 18:12

## 2018-11-12 RX ADMIN — METOPROLOL TARTRATE SCH MG: 25 TABLET, FILM COATED ORAL at 08:56

## 2018-11-12 RX ADMIN — ASPIRIN 81 MG SCH MG: 81 TABLET ORAL at 11:15

## 2018-11-12 RX ADMIN — ATORVASTATIN CALCIUM SCH MG: 20 TABLET, FILM COATED ORAL at 21:00

## 2018-11-12 RX ADMIN — METOPROLOL TARTRATE SCH MG: 25 TABLET, FILM COATED ORAL at 21:00

## 2018-11-12 NOTE — CARDIAC ELECTROPHYSIOLOGY PN
Assessment/Plan


Assessment/Plan





1. Elevated troponin and history of CAD and stent placement three months ago at 

[facility].  


On Aspirin, Plavix, Lipitor 40 and Lopressor  100 bid . Refuses aspirin


Could be due to atrial fib with RVR. Echo Nl EF 55% Levels are flat


  Lexiscan Cardiolite today is pending





2. Atrial fibrillation with rapid ventricular response.   


Continue Eliquis, Lopressor 100 bid





3. Severe COPD and heavy smoking.





BHARGAV RN





Subjective


Subjective


 No CP or SOB. In 60s in fib. Nuclear stress test today is pending





Objective





Last 24 Hour Vital Signs








  Date Time  Temp Pulse Resp B/P (MAP) Pulse Ox O2 Delivery O2 Flow Rate FiO2


 


11/12/18 12:00 97.7 91 20 136/95 (109) 95   


 


11/12/18 08:56  72  154/70    


 


11/12/18 08:53  72 20  99 Nasal Cannula 2.0 28


 


11/12/18 08:10  69      


 


11/12/18 08:10      Room Air  


 


11/12/18 08:06 97.9 67 20 154/70 (98) 95   


 


11/12/18 07:43  77 16   Room Air  


 


11/12/18 04:00 97.7 66 18 149/80 (103) 96   


 


11/12/18 04:00  64      


 


11/12/18 00:00  64      


 


11/12/18 00:00 98.3 63 17 120/59 (79) 94   


 


11/11/18 21:00      Room Air  


 


11/11/18 20:35  66  122/63    


 


11/11/18 20:28  72 18   Room Air  


 


11/11/18 20:00  75      


 


11/11/18 20:00 97.9 66 18 122/63 (82) 94   


 


11/11/18 17:23    156/93 (114)    


 


11/11/18 17:02    172/87    


 


11/11/18 16:00  85      


 


11/11/18 16:00 97.7 74 19 172/87 (115) 100   





  74      

















Intake and Output  


 


 11/11/18 11/12/18





 18:59 06:59


 


Intake Total 1200 ml 1391 ml


 


Output Total 1000 ml 


 


Balance 200 ml 1391 ml


 


  


 


Intake Oral 1200 ml 


 


IV Total  1391 ml


 


Output Urine Total 1000 ml 


 


# Voids 4 3











Laboratory Tests








Test


  11/11/18


13:07 11/12/18


08:06


 


Troponin I


  1.290 ng/mL


(0.000-0.056) 


 


 


White Blood Count


  


  7.2 K/UL


(4.8-10.8)


 


Red Blood Count


  


  5.05 M/UL


(4.70-6.10)


 


Hemoglobin


  


  13.0 G/DL


(14.2-18.0)  L


 


Hematocrit


  


  39.7 %


(42.0-52.0)  L


 


Mean Corpuscular Volume


  


  79 FL (80-99)


L


 


Mean Corpuscular Hemoglobin


  


  25.8 PG


(27.0-31.0)  L


 


Mean Corpuscular Hemoglobin


Concent 


  32.8 G/DL


(32.0-36.0)


 


Red Cell Distribution Width


  


  13.7 %


(11.6-14.8)


 


Platelet Count


  


  126 K/UL


(150-450)  L


 


Mean Platelet Volume


  


  9.4 FL


(6.5-10.1)


 


Neutrophils (%) (Auto)


  


  73.6 %


(45.0-75.0)


 


Lymphocytes (%) (Auto)


  


  13.9 %


(20.0-45.0)  L


 


Monocytes (%) (Auto)


  


  10.0 %


(1.0-10.0)


 


Eosinophils (%) (Auto)


  


  1.8 %


(0.0-3.0)


 


Basophils (%) (Auto)


  


  0.8 %


(0.0-2.0)


 


Sodium Level


  


  141 MMOL/L


(136-145)


 


Potassium Level


  


  4.7 MMOL/L


(3.5-5.1)


 


Chloride Level


  


  106 MMOL/L


()


 


Carbon Dioxide Level


  


  28 MMOL/L


(21-32)


 


Anion Gap


  


  7 mmol/L


(5-15)


 


Blood Urea Nitrogen


  


  17 mg/dL


(7-18)


 


Creatinine


  


  0.8 MG/DL


(0.55-1.30)


 


Estimat Glomerular Filtration


Rate 


  > 60 mL/min


(>60)


 


Glucose Level


  


  95 MG/DL


()


 


Calcium Level


  


  8.6 MG/DL


(8.5-10.1)


 


Phosphorus Level


  


  3.7 MG/DL


(2.5-4.9)


 


Magnesium Level


  


  1.8 MG/DL


(1.8-2.4)








Objective





HEAD AND NECK:  Shows no JVD.


LUNGS:  Coarse rhonchi.


CARDIOVASCULAR:  Irregularly irregular.  S1 and S2 with no gallop or


murmur.


ABDOMEN:  Soft.


EXTREMITIES:  No pitting edema.  There is diffuse ecchymoses.











Camilo Pardo MD Nov 12, 2018 13:03

## 2018-11-12 NOTE — CARDIOLOGY REPORT
--------------- APPROVED REPORT --------------





EXAM: Two-dimensional and M-mode echocardiogram with Doppler and color 

Doppler.



INDICATION

Coronary artery disease



M-Mode DIMENSIONS 

IVSd1.1 (0.7-1.1cm)Left Atrium (MM)4.0 (1.6-4.0cm)

LVDd5.0 (3.5-5.6cm)Aortic Root2.3 (2.0-3.7cm)

PWd1.1 (0.7-1.1cm)Aortic Cusp Exc.1.5 (1.5-2.0cm)



LVDs2.5 (2.5-4.0cm)

PWs1.9 cm





Normal left ventricular chamber size.

Left ventricular apical akinesis. 

Left ventricular ejection fraction estimated to be 50 %.

Borderline left ventricular hypertrophy.

Anterior Echo-free space, may be due to pericardial fat or effusion.

All other cardiac chamber sizes are within normal limits. 

Mild focal aortic valve sclerosis with adequate cusp excursion.

Mildy thickened mitral valve leaflets with normal excursion.

Mild mitral annulus and aortic root calcification.

Normal pulmonic valve structure. 

Normal tricuspid valve structure.  

IVC is normal in size with physiological collapse. 



A  color flow and spectral Doppler study was performed and revealed:

No aortic insufficiency.

Mild mitral regurgitation.

Left ventricular diastolic function could not be determined due to A-Fib. 

Trace tricuspid regurgitation.

Tricuspid systolic velocities suggests peak right ventricular systolic pressure of 19 

mmHg.

No pulmonic regurgitation present.

## 2018-11-12 NOTE — GENERAL PROGRESS NOTE
Assessment/Plan


Problem List:  


(1) Atrial fibrillation with RVR


ICD Codes:  I48.91 - Unspecified atrial fibrillation


SNOMED:  801969661440537


(2) NSTEMI (non-ST elevated myocardial infarction)


ICD Codes:  I21.4 - Non-ST elevation (NSTEMI) myocardial infarction


SNOMED:  445273800


(3) CAD (coronary artery disease)


ICD Codes:  I25.10 - Atherosclerotic heart disease of native coronary artery 

without angina pectoris


SNOMED:  44638277


(4) CHF (congestive heart failure)


ICD Codes:  I50.9 - Heart failure, unspecified


SNOMED:  70428337


(5) COPD (chronic obstructive pulmonary disease)


ICD Codes:  J44.9 - Chronic obstructive pulmonary disease, unspecified


SNOMED:  81076553


(6) Tobacco abuse


ICD Codes:  Z72.0 - Tobacco use


SNOMED:  339584319


(7) Major depression


ICD Codes:  F32.9 - Major depressive disorder, single episode, unspecified


SNOMED:  923656850


Assessment/Plan


Afib RVR with NSTEMI and history of PCI s/p stent at [facility] 3/18/ history of 

CHF , SSS?


- appreciate reqs by  Cardiology Dr. Pardo , continue lopressor if HR 

tolerates 


- elevated troponins 2/2 troponin leak-> f/u stress test, part one completed, 

pending part two 


- cont asa, plavix, eliquis, BB, ACE-I, statin, 


- serial troponins and EKG 


- echo-> EF 50-55% RVSP 19 


- monitor BP goal Map >65 , HR < 110





Elevated dimer and tachycardia


- CTA to r/o PE -> negative 


- IVF mateo-contrast to avoid JACINTO





COPD and current tobacco abuse 


- Pulmonology Consult: Dr. Chalino figueroa 


- patient unaware if he was on steroids, holding for now will monitor and 

discuss with pulmonology 


- nicotine patch 


- tobacco cessation education





Major Depression


- Psychiatry Consult: Dr. Medina 





Code Status


- spoke to patient regarding code status and clarified that patient would like 

to be FULL CODE. He mentions he was referred to a hospice program? but unsure 

why and was not explained well about his medical condition





DVT


- patient on sufficient anticoagulation





Dispo 


- pending 





I have spent 70 minutes regarding patient care, coordination and counseling.





Subjective


Date patient seen:  Nov 12, 2018


Time patient seen:  09:00


ROS Limited/Unobtainable:  No


Allergies:  


Coded Allergies:  


     AMPICILLIN (Verified  Allergy, Unknown, 8/3/18)


Uncoded Allergies:  


     PENICILLIN (Allergy, Unknown, 8/3/18)


Subjective


patient denies chest pain, sob, fevers, chills, schrader, sob.





Objective





Last 24 Hour Vital Signs








  Date Time  Temp Pulse Resp B/P (MAP) Pulse Ox O2 Delivery O2 Flow Rate FiO2


 


11/12/18 08:56  72  154/70    


 


11/12/18 08:53  72 20  99 Nasal Cannula 2.0 28


 


11/12/18 08:06 97.9 67 20 154/70 (98) 95   


 


11/12/18 07:43  77 16   Room Air  


 


11/12/18 04:00 97.7 66 18 149/80 (103) 96   


 


11/12/18 04:00  64      


 


11/12/18 00:00  64      


 


11/12/18 00:00 98.3 63 17 120/59 (79) 94   


 


11/11/18 21:00      Room Air  


 


11/11/18 20:35  66  122/63    


 


11/11/18 20:28  72 18   Room Air  


 


11/11/18 20:00  75      


 


11/11/18 20:00 97.9 66 18 122/63 (82) 94   


 


11/11/18 17:23    156/93 (114)    


 


11/11/18 17:02    172/87    


 


11/11/18 16:00  85      


 


11/11/18 16:00 97.7 74 19 172/87 (115) 100   





  74      


 


11/11/18 12:00  59      

















Intake and Output  


 


 11/11/18 11/12/18





 18:59 06:59


 


Intake Total 1200 ml 1391 ml


 


Output Total 1000 ml 


 


Balance 200 ml 1391 ml


 


  


 


Intake Oral 1200 ml 


 


IV Total  1391 ml


 


Output Urine Total 1000 ml 


 


# Voids 4 3








Laboratory Tests


11/11/18 13:07: Troponin I 1.290H


11/12/18 08:06: 


White Blood Count 7.2, Red Blood Count 5.05, Hemoglobin 13.0L, Hematocrit 39.7L

, Mean Corpuscular Volume 79L, Mean Corpuscular Hemoglobin 25.8L, Mean 

Corpuscular Hemoglobin Concent 32.8, Red Cell Distribution Width 13.7, Platelet 

Count 126L, Mean Platelet Volume 9.4, Neutrophils (%) (Auto) 73.6, Lymphocytes (

%) (Auto) 13.9L, Monocytes (%) (Auto) 10.0, Eosinophils (%) (Auto) 1.8, 

Basophils (%) (Auto) 0.8, Sodium Level 141, Potassium Level 4.7, Chloride Level 

106, Carbon Dioxide Level 28, Anion Gap 7, Blood Urea Nitrogen 17, Creatinine 

0.8, Estimat Glomerular Filtration Rate > 60, Glucose Level 95, Calcium Level 

8.6, Phosphorus Level 3.7, Magnesium Level 1.8


Height (Feet):  5


Height (Inches):  7.00


Weight (Pounds):  122


General Appearance:  WD/WN, no apparent distress, alert


EENT:  PERRL/EOMI, normal ENT inspection


Neck:  non-tender, normal alignment, supple


Cardiovascular:  normal peripheral pulses, normal rate, regular rhythm, 

regularly irregular, no gallop/murmur


Respiratory/Chest:  chest wall non-tender, lungs clear, normal breath sounds, 

no respiratory distress, no accessory muscle use


Abdomen:  normal bowel sounds, non tender, soft, no organomegaly, no mass, 

abnormal bowel sounds


Extremities:  normal range of motion, non-tender


Edema:  no edema noted Arm (L), no edema noted Arm (R), no edema noted Leg (L), 

no edema noted Leg (R), no edema noted Pedal (L), no edema noted Pedal (R), no 

edema noted Generalized


Neurologic:  CNs II-XII grossly normal, no motor/sensory deficits, oriented x 3











Charity Don DO Nov 12, 2018 10:02

## 2018-11-12 NOTE — PULMONOLGY CRITICAL CARE NOTE
Critical Care - Asmt/Plan


Assessment/Plan:


Patient is a 68-year-old male with a history of COPD/Asthma, HTN, atrial 

fibrillation on Eliquis, ptsd presents with spitting up blood from his mouth, 

he reports no pain complaints, but reports that he fell off a ladder yesterday, 

it was roughly 5 feet high, it tipped over and he fell onto a pile of junk in 

the closet, he did not hit his head or lose consciousness, and he didn't think 

much of it until this morning when he started noticing blood in his mouth.  He 

denies neck or back pain, vomiting, does report he's been bleeding from his 

arms from the fall yesterday but stopped temporarily and starts again so he 

finally came in for an evaluation. CT Head/neck no evidence fracture, IC 

pathology





CT Chest: 1.  Limited due to motion.


2.  No definite PE or aortic dissection.


3.  Possible minimal pulmonary edema/infiltrates, versus motion artifact.


 





Allergies:  


 


     AMPICILLIN (Verified  Allergy, Unknown, 8/3/18)  


     PENICILLIN (Allergy, Unknown, 8/3/18)








Reviewed Nursing Documentation:  PMH: Agreed; PSxH: Agreed





Nursing Documentation-PMH


Past Medical History:  No History, Except For


Hx Cardiac Problems:  Yes - bradycardia, Afib


Hx Hypertension:  Yes


Hx Pacemaker:  No


Hx Asthma:  Yes


Hx COPD:  Yes


Hx Diabetes:  No


Hx Cancer:  No


Hx Gastrointestinal Problems:  No


Hx Dialysis:  No


History Of Psychiatric Problem:  Yes - PTSD


Hx Neurological Problems:  No


Hx Cerebrovascular Accident:  No


Hx Seizures:  No





Review of Systems


All Other Systems:  negative except mentioned in HPI





Physical Exam





Vital Signs noted








General Appearance:  alert, non-toxic, mild distress


Head:  normocephalic


Eyes:  bilateral eye normal inspection, bilateral eye PERRL, bilateral eye EOMI


ENT:  normal ENT inspection, hearing grossly normal, normal pharynx, no 

angioedema, normal voice, moist mucus membranes, other - Multiple missing teeth

, dried blood and abrasions on the internal aspect of the upper and lower lip, 

no loose teeth


Neck:  normal inspection, full range of motion, supple, supple/symm/no masses


Respiratory:  chest non-tender, lungs clear, normal breath sounds, no rhonchi, 

speaking full sentences, wheezing, expiration, chest symmetrical, palpation of 

chest normal


Cardiovascular normal peripheral pulses, regular rate, rhythm, no edema, no 

gallop, no JVD, no murmur, no rub, tachycardia


Gastrointestinal:  normal inspection, non tender, soft, no mass, no guarding, 

no rebound


Rectal:  deferred


Genitourinary:  normal inspection, no CVA tenderness


Musculoskeletal:  back normal, gait/station normal, normal range of motion, non-

tender, no calf tenderness


Neurologic:  alert, responsive, CNs III-XII nml as tested, motor strength/tone 

normal, sensory intact, speech normal


Psychiatric:  judgement/insight normal, memory normal, mood/affect normal


Skin:  normal color, no rash, warm/dry, normal turgor, other - A few 1 cm Skin 

tears on bilateral Arms no lacerations, abrasions


Lymphatic:  no adenopathy





EKG Time:  10:02


EP Interpretation:  afib rate 107, no stemi


Rate:  tachycardiac


Rhythm:  other - afib


ST Segments:  no acute changes


ASA given to the pt in ED:  No





 


   Chest X-Ray: 


   Indication:  Shortness of Breath


   EP Interpretation:  Yes


   Interpretation:  no consolidation, no effusion, no pneumothorax, no acute 

cardiopulmonary disease


   Impression:  No acute disease





CT Chest:


1.  Limited due to motion.


2.  No definite PE or aortic dissection.


3.  Possible minimal pulmonary edema/infiltrates, versus motion artifact.


 








Impression:  


 


 COPD (chronic obstructive pulmonary disease)


 S/p Fall


 Skin tear, oral abrasions


 Abrasion


 Afibrillation


 H/o HTN





For COPD comntinue with nebulized treatments as well as steroids


Follow Troponin


Admit ICU


Serial Labs








Critical Care - Objective





Last 24 Hour Vital Signs








  Date Time  Temp Pulse Resp B/P (MAP) Pulse Ox O2 Delivery O2 Flow Rate FiO2


 


11/12/18 12:00 97.7 91 20 136/95 (109) 95   


 


11/12/18 08:56  72  154/70    


 


11/12/18 08:53  72 20  99 Nasal Cannula 2.0 28


 


11/12/18 08:10  69      


 


11/12/18 08:10      Room Air  


 


11/12/18 08:06 97.9 67 20 154/70 (98) 95   


 


11/12/18 07:43  77 16   Room Air  


 


11/12/18 04:00 97.7 66 18 149/80 (103) 96   


 


11/12/18 04:00  64      


 


11/12/18 00:00  64      


 


11/12/18 00:00 98.3 63 17 120/59 (79) 94   


 


11/11/18 21:00      Room Air  


 


11/11/18 20:35  66  122/63    


 


11/11/18 20:28  72 18   Room Air  


 


11/11/18 20:00  75      


 


11/11/18 20:00 97.9 66 18 122/63 (82) 94   


 


11/11/18 17:23    156/93 (114)    


 


11/11/18 17:02    172/87    


 


11/11/18 16:00  85      


 


11/11/18 16:00 97.7 74 19 172/87 (115) 100   





  74      











Critical Care - Subjective


ROS Limited/Unobtainable:  No


FI02:  28


Sputum Amount:  None


I&O:











Intake and Output  


 


 11/11/18 11/12/18





 19:00 07:00


 


Intake Total 1760 ml 931 ml


 


Output Total 1000 ml 


 


Balance 760 ml 931 ml


 


  


 


Intake Oral 1200 ml 


 


IV Total 560 ml 931 ml


 


Output Urine Total 1000 ml 


 


# Voids 4 3

















Jonathan Allred MD Nov 12, 2018 15:00

## 2018-11-12 NOTE — DIAGNOSTIC IMAGING REPORT
Indications: Chest pain, coronary artery disease

 

Technique: Single day single isotope protocol utilized. Initially, resting images

obtained using IV administration 9.5 millicuries 99M technetium Myoview.

Subsequently, patient underwent  lexiscan stress testing. See cardiology report for

details. During Lexiscan infusion, IV administration 30.8 mCi 99 M technetium

Myoview. SPECT and planar images obtained. SPECT images gated to 8 phases of the

cardiac cycle were also obtained, and reformatted into cine images for evaluation of

ejection fraction.

 

Comparison: none

 

Findings: Presence or absence of symptoms during infusion is not described on the

cardiology report. Per cardiology report, resting EKG demonstrates atrial

fibrillation. Presence or absence of ST changes is not described on the cardiology

report.  Imaging demonstrates large perfusion defect involving the apex, extending

well into the anterior and inferior walls. There is also subtle decreased septal

perfusion. There may be slight likely increased perfusion in the septum and anterior

wall near the base on the resting images, but this is very questionable. There is

diffuse dilatation of the left ventricular chamber.. Calculated post stress ejection

fraction 44%

 

Impression: Nonischemic clinical response to pharmacologic stress, per cardiology

report

 

Nonischemic electrocardiographic response to pharmacologic stress, per cardiology

report

 

Large apical, anterior, inferior, and possibly septal infarct. Very equivocal small

area of mateo-infarct ischemia in the anterior wall near the base and the septum

 

Calculated post stress ejection fraction 44%

## 2018-11-13 VITALS — SYSTOLIC BLOOD PRESSURE: 146 MMHG | DIASTOLIC BLOOD PRESSURE: 82 MMHG

## 2018-11-13 VITALS — DIASTOLIC BLOOD PRESSURE: 70 MMHG | SYSTOLIC BLOOD PRESSURE: 123 MMHG

## 2018-11-13 VITALS — DIASTOLIC BLOOD PRESSURE: 75 MMHG | SYSTOLIC BLOOD PRESSURE: 156 MMHG

## 2018-11-13 VITALS — SYSTOLIC BLOOD PRESSURE: 152 MMHG | DIASTOLIC BLOOD PRESSURE: 82 MMHG

## 2018-11-13 VITALS — SYSTOLIC BLOOD PRESSURE: 155 MMHG | DIASTOLIC BLOOD PRESSURE: 81 MMHG

## 2018-11-13 LAB
ADD MANUAL DIFF: NO
BASOPHILS NFR BLD AUTO: 0.9 % (ref 0–2)
BUN SERPL-MCNC: 19 MG/DL (ref 7–18)
CALCIUM SERPL-MCNC: 9.3 MG/DL (ref 8.5–10.1)
CHLORIDE SERPL-SCNC: 104 MMOL/L (ref 98–107)
CO2 SERPL-SCNC: 32 MMOL/L (ref 21–32)
CREAT SERPL-MCNC: 0.9 MG/DL (ref 0.55–1.3)
EOSINOPHIL NFR BLD AUTO: 4.3 % (ref 0–3)
ERYTHROCYTE [DISTWIDTH] IN BLOOD BY AUTOMATED COUNT: 13.5 % (ref 11.6–14.8)
HCT VFR BLD CALC: 38.2 % (ref 42–52)
HGB BLD-MCNC: 12.4 G/DL (ref 14.2–18)
LYMPHOCYTES NFR BLD AUTO: 16.6 % (ref 20–45)
MCV RBC AUTO: 78 FL (ref 80–99)
MONOCYTES NFR BLD AUTO: 10.1 % (ref 1–10)
NEUTROPHILS NFR BLD AUTO: 68.2 % (ref 45–75)
PLATELET # BLD: 139 K/UL (ref 150–450)
POTASSIUM SERPL-SCNC: 4.3 MMOL/L (ref 3.5–5.1)
RBC # BLD AUTO: 4.89 M/UL (ref 4.7–6.1)
SODIUM SERPL-SCNC: 141 MMOL/L (ref 136–145)
WBC # BLD AUTO: 7 K/UL (ref 4.8–10.8)

## 2018-11-13 RX ADMIN — APIXABAN SCH MG: 2.5 TABLET, FILM COATED ORAL at 08:16

## 2018-11-13 RX ADMIN — APIXABAN SCH MG: 2.5 TABLET, FILM COATED ORAL at 17:40

## 2018-11-13 RX ADMIN — ASPIRIN 81 MG SCH MG: 81 TABLET ORAL at 08:15

## 2018-11-13 RX ADMIN — METOPROLOL TARTRATE SCH MG: 25 TABLET, FILM COATED ORAL at 08:16

## 2018-11-13 NOTE — CARDIAC ELECTROPHYSIOLOGY PN
Assessment/Plan


Assessment/Plan





1. Elevated troponin and history of CAD and stent placement three months ago at 

[facility].  


On Aspirin, Plavix, Lipitor 40 and Lopressor  100 bid .  


Could be due to atrial fib with RVR. Echo Nl EF 55% Levels are flat





  Lexiscan Cardiolite showed Large apical, anterior, inferior, and possibly 

septal infarct. Very equivocal small


  area of mateo-infarct ischemia in the anterior wall near the base and the 

septum


 Continue medical therapy.No Cardiac cath needed at this point. No CP


 


2. Atrial fibrillation with rapid ventricular response.   


 Continue Eliquis, Lopressor 100 bid





3. Severe COPD and heavy smoking.





DW RN





Subjective


Subjective


 No CP or SOB. In 60s in fib. Nuclear stress test was suggestive of ischemia





Objective





Last 24 Hour Vital Signs








  Date Time  Temp Pulse Resp B/P (MAP) Pulse Ox O2 Delivery O2 Flow Rate FiO2


 


11/13/18 12:00  71      


 


11/13/18 12:00 97.3 55 18 146/82 (103) 98   


 


11/13/18 09:00      Room Air  


 


11/13/18 08:19  78 16   Room Air  


 


11/13/18 08:16  82  152/82    


 


11/13/18 08:00  56      


 


11/13/18 08:00 98.3 82 18 152/82 (105) 95   


 


11/13/18 04:00 97.5 73 20 156/75 (102) 95   


 


11/13/18 04:00  61      


 


11/13/18 00:00  69      


 


11/13/18 00:00 97.9 63 18 155/81 (105) 96   


 


11/12/18 21:00  73  142/53    


 


11/12/18 21:00      Room Air  


 


11/12/18 20:09  74 16   Room Air  


 


11/12/18 20:09      Room Air  21


 


11/12/18 20:00  97      


 


11/12/18 20:00 97.9 73 22 142/53 (82) 96   


 


11/12/18 16:05 97.9 69 20 150/76 (100) 95   


 


11/12/18 16:00  88      

















Intake and Output  


 


 11/12/18 11/13/18





 18:59 06:59


 


Intake Total 700 ml 


 


Output Total 1300 ml 


 


Balance -600 ml 


 


  


 


Intake Oral 600 ml 


 


IV Total 100 ml 


 


Output Urine Total 1300 ml 


 


# Voids  2


 


# Bowel Movements  1











Laboratory Tests








Test


  11/13/18


05:50


 


White Blood Count


  7.0 K/UL


(4.8-10.8)


 


Red Blood Count


  4.89 M/UL


(4.70-6.10)


 


Hemoglobin


  12.4 G/DL


(14.2-18.0)  L


 


Hematocrit


  38.2 %


(42.0-52.0)  L


 


Mean Corpuscular Volume


  78 FL (80-99)


L


 


Mean Corpuscular Hemoglobin


  25.4 PG


(27.0-31.0)  L


 


Mean Corpuscular Hemoglobin


Concent 32.5 G/DL


(32.0-36.0)


 


Red Cell Distribution Width


  13.5 %


(11.6-14.8)


 


Platelet Count


  139 K/UL


(150-450)  L


 


Mean Platelet Volume


  8.4 FL


(6.5-10.1)


 


Neutrophils (%) (Auto)


  68.2 %


(45.0-75.0)


 


Lymphocytes (%) (Auto)


  16.6 %


(20.0-45.0)  L


 


Monocytes (%) (Auto)


  10.1 %


(1.0-10.0)  H


 


Eosinophils (%) (Auto)


  4.3 %


(0.0-3.0)  H


 


Basophils (%) (Auto)


  0.9 %


(0.0-2.0)


 


Sodium Level


  141 MMOL/L


(136-145)


 


Potassium Level


  4.3 MMOL/L


(3.5-5.1)


 


Chloride Level


  104 MMOL/L


()


 


Carbon Dioxide Level


  32 MMOL/L


(21-32)


 


Blood Urea Nitrogen


  19 mg/dL


(7-18)  H


 


Creatinine


  0.9 MG/DL


(0.55-1.30)


 


Estimat Glomerular Filtration


Rate > 60 mL/min


(>60)


 


Glucose Level


  113 MG/DL


()  H


 


Calcium Level


  9.3 MG/DL


(8.5-10.1)


 


Magnesium Level


  1.7 MG/DL


(1.8-2.4)  L








Objective


HEAD AND NECK:  Shows no JVD.


LUNGS:  Coarse rhonchi.


CARDIOVASCULAR:  Irregularly irregular.  S1 and S2 with no gallop or


murmur.


ABDOMEN:  Soft.


EXTREMITIES:  No pitting edema.  There is diffuse ecchymoses.











Camilo Pardo MD Nov 13, 2018 15:27

## 2018-11-13 NOTE — PULMONOLGY CRITICAL CARE NOTE
Critical Care - Asmt/Plan


Assessment/Plan:


Patient is a 68-year-old male with a history of COPD/Asthma, HTN, atrial 

fibrillation on Eliquis, ptsd presents with spitting up blood from his mouth, 

he reports no pain complaints, but reports that he fell off a ladder yesterday, 

it was roughly 5 feet high, it tipped over and he fell onto a pile of junk in 

the closet, he did not hit his head or lose consciousness, and he didn't think 

much of it until this morning when he started noticing blood in his mouth.  He 

denies neck or back pain, vomiting, does report he's been bleeding from his 

arms from the fall yesterday but stopped temporarily and starts again so he 

finally came in for an evaluation. CT Head/neck no evidence fracture, IC 

pathology





No new complaints





CT Chest: 1.  Limited due to motion.


2.  No definite PE or aortic dissection.


3.  Possible minimal pulmonary edema/infiltrates, versus motion artifact.


 





Allergies:  


 


     AMPICILLIN (Verified  Allergy, Unknown, 8/3/18)  


     PENICILLIN (Allergy, Unknown, 8/3/18)








Reviewed Nursing Documentation:  PMH: Agreed; PSxH: Agreed





Nursing Documentation-PMH


Past Medical History:  No History, Except For


Hx Cardiac Problems:  Yes - bradycardia, Afib


Hx Hypertension:  Yes


Hx Pacemaker:  No


Hx Asthma:  Yes


Hx COPD:  Yes


Hx Diabetes:  No


Hx Cancer:  No


Hx Gastrointestinal Problems:  No


Hx Dialysis:  No


History Of Psychiatric Problem:  Yes - PTSD


Hx Neurological Problems:  No


Hx Cerebrovascular Accident:  No


Hx Seizures:  No





Review of Systems


All Other Systems:  negative except mentioned in HPI





Physical Exam





Vital Signs noted








General Appearance:  alert, non-toxic, mild distress


Head:  normocephalic


Eyes:  bilateral eye normal inspection, bilateral eye PERRL, bilateral eye EOMI


ENT:  normal ENT inspection, hearing grossly normal, normal pharynx, no 

angioedema, normal voice, moist mucus membranes, other - Multiple missing teeth

, dried blood and abrasions on the internal aspect of the upper and lower lip, 

no loose teeth


Neck:  normal inspection, full range of motion, supple, supple/symm/no masses


Respiratory:  chest non-tender, lungs clear, normal breath sounds, no rhonchi, 

speaking full sentences, wheezing, expiration, chest symmetrical, palpation of 

chest normal


Cardiovascular normal peripheral pulses, regular rate, rhythm, no edema, no 

gallop, no JVD, no murmur, no rub, tachycardia


Gastrointestinal:  normal inspection, non tender, soft, no mass, no guarding, 

no rebound


Rectal:  deferred


Genitourinary:  normal inspection, no CVA tenderness


Musculoskeletal:  back normal, gait/station normal, normal range of motion, non-

tender, no calf tenderness


Neurologic:  alert, responsive, CNs III-XII nml as tested, motor strength/tone 

normal, sensory intact, speech normal


Psychiatric:  judgement/insight normal, memory normal, mood/affect normal


Skin:  normal color, no rash, warm/dry, normal turgor, other - A few 1 cm Skin 

tears on bilateral Arms no lacerations, abrasions


Lymphatic:  no adenopathy





EKG Time:  10:02


EP Interpretation:  afib rate 107, no stemi


Rate:  tachycardiac


Rhythm:  other - afib


ST Segments:  no acute changes


ASA given to the pt in ED:  No





 


   Chest X-Ray: 


   Indication:  Shortness of Breath


   EP Interpretation:  Yes


   Interpretation:  no consolidation, no effusion, no pneumothorax, no acute 

cardiopulmonary disease


   Impression:  No acute disease





CT Chest:


1.  Limited due to motion.


2.  No definite PE or aortic dissection.


3.  Possible minimal pulmonary edema/infiltrates, versus motion artifact.


 








Impression:  


 


 COPD (chronic obstructive pulmonary disease)


 S/p Fall


 Skin tear, oral abrasions


 Abrasion


 Afibrillation


 H/o HTN





For COPD comntinue with nebulized treatments as well as steroids


Follow Troponin


Admit ICU


Serial Labs








Critical Care - Objective





Last 24 Hour Vital Signs








  Date Time  Temp Pulse Resp B/P (MAP) Pulse Ox O2 Delivery O2 Flow Rate FiO2


 


11/13/18 12:00  71      


 


11/13/18 12:00 97.3 55 18 146/82 (103) 98   


 


11/13/18 09:00      Room Air  


 


11/13/18 08:19  78 16   Room Air  


 


11/13/18 08:16  82  152/82    


 


11/13/18 08:00  56      


 


11/13/18 08:00 98.3 82 18 152/82 (105) 95   


 


11/13/18 04:00 97.5 73 20 156/75 (102) 95   


 


11/13/18 04:00  61      


 


11/13/18 00:00  69      


 


11/13/18 00:00 97.9 63 18 155/81 (105) 96   


 


11/12/18 21:00  73  142/53    


 


11/12/18 21:00      Room Air  


 


11/12/18 20:09  74 16   Room Air  


 


11/12/18 20:09      Room Air  21


 


11/12/18 20:00  97      


 


11/12/18 20:00 97.9 73 22 142/53 (82) 96   


 


11/12/18 16:05 97.9 69 20 150/76 (100) 95   


 


11/12/18 16:00  88      











Critical Care - Subjective


ROS Limited/Unobtainable:  No


FI02:  21


Sputum Amount:  None


I&O:











Intake and Output  


 


 11/12/18 11/13/18





 18:59 06:59


 


Intake Total 700 ml 


 


Output Total 1300 ml 


 


Balance -600 ml 


 


  


 


Intake Oral 600 ml 


 


IV Total 100 ml 


 


Output Urine Total 1300 ml 


 


# Voids  2


 


# Bowel Movements  1

















Jonathan Allred MD Nov 13, 2018 14:19

## 2018-11-13 NOTE — GENERAL PROGRESS NOTE
Assessment/Plan


Problem List:  


(1) Atrial fibrillation with RVR


ICD Codes:  I48.91 - Unspecified atrial fibrillation


SNOMED:  155590511639516


(2) NSTEMI (non-ST elevated myocardial infarction)


ICD Codes:  I21.4 - Non-ST elevation (NSTEMI) myocardial infarction


SNOMED:  823301553


(3) CAD (coronary artery disease)


ICD Codes:  I25.10 - Atherosclerotic heart disease of native coronary artery 

without angina pectoris


SNOMED:  95889196


(4) CHF (congestive heart failure)


ICD Codes:  I50.9 - Heart failure, unspecified


SNOMED:  25790479


(5) COPD (chronic obstructive pulmonary disease)


ICD Codes:  J44.9 - Chronic obstructive pulmonary disease, unspecified


SNOMED:  80552562


(6) Tobacco abuse


ICD Codes:  Z72.0 - Tobacco use


SNOMED:  273668206


(7) Major depression


ICD Codes:  F32.9 - Major depressive disorder, single episode, unspecified


SNOMED:  001317260


Assessment/Plan


Afib RVR with NSTEMI and history of PCI s/p stent at [facility] 3/18/ history of 

CHF , SSS?


- stress tet reviewed, pending cardiology recommendation





 


Findings: Presence or absence of symptoms during infusion is not described on 

the


cardiology report. Per cardiology report, resting EKG demonstrates atrial


fibrillation. Presence or absence of ST changes is not described on the 

cardiology


report.  Imaging demonstrates large perfusion defect involving the apex, 

extending


well into the anterior and inferior walls. There is also subtle decreased septal


perfusion. There may be slight likely increased perfusion in the septum and 

anterior


wall near the base on the resting images, but this is very questionable. There 

is


diffuse dilatation of the left ventricular chamber.. Calculated post stress 

ejection


fraction 44%


 


Impression: Nonischemic clinical response to pharmacologic stress, per 

cardiology


report


 


Nonischemic electrocardiographic response to pharmacologic stress, per 

cardiology


report


 


Large apical, anterior, inferior, and possibly septal infarct. Very equivocal 

small


area of mateo-infarct ischemia in the anterior wall near the base and the septum


 


Calculated post stress ejection fraction 44%











- appreciate reqs by  Cardiology Dr. Pardo , continue lopressor if HR 

tolerates 


- cont asa, plavix, eliquis, BB, ACE-I, statin, 


- serial troponins and EKG 


- echo-> EF 50-55% RVSP 19 


- monitor BP goal Map >65 , HR < 110





Elevated dimer and tachycardia


- CTA to r/o PE -> negative 








COPD and current tobacco abuse 


- Pulmonology Consult: Dr. Chalino figueroa 


- patient unaware if he was on steroids, holding for now will monitor and 

discuss with pulmonology 


- nicotine patch 


- tobacco cessation education





Major Depression


- Psychiatry Consult: Dr. Medina 





Code Status


- spoke to patient regarding code status and clarified that patient would like 

to be FULL CODE. He mentions he was referred to a hospice program? but unsure 

why and was not explained well about his medical condition





DVT


- patient on sufficient anticoagulation





Dispo 


- pending 





I have spent 70 minutes regarding patient care, coordination and counseling.





Subjective


Date patient seen:  Nov 13, 2018


Time patient seen:  09:30


Allergies:  


Coded Allergies:  


     AMPICILLIN (Verified  Allergy, Unknown, 8/3/18)


Uncoded Allergies:  


     PENICILLIN (Allergy, Unknown, 8/3/18)


Subjective


patient denies chest pain, sob, fevers, chills, schrader, sob. Patient complains of 

itching behind his neck that is traveling to his head.





Objective





Last 24 Hour Vital Signs








  Date Time  Temp Pulse Resp B/P (MAP) Pulse Ox O2 Delivery O2 Flow Rate FiO2


 


11/13/18 09:00      Room Air  


 


11/13/18 08:19  78 16   Room Air  


 


11/13/18 08:16  82  152/82    


 


11/13/18 08:00  56      


 


11/13/18 08:00 98.3 82 18 152/82 (105) 95   


 


11/13/18 04:00 97.5 73 20 156/75 (102) 95   


 


11/13/18 04:00  61      


 


11/13/18 00:00  69      


 


11/13/18 00:00 97.9 63 18 155/81 (105) 96   


 


11/12/18 21:00  73  142/53    


 


11/12/18 21:00      Room Air  


 


11/12/18 20:09  74 16   Room Air  


 


11/12/18 20:09      Room Air  21


 


11/12/18 20:00  97      


 


11/12/18 20:00 97.9 73 22 142/53 (82) 96   


 


11/12/18 16:05 97.9 69 20 150/76 (100) 95   


 


11/12/18 16:00  88      


 


11/12/18 12:00 97.7 91 20 136/95 (109) 95   


 


11/12/18 11:56  84      

















Intake and Output  


 


 11/12/18 11/13/18





 19:00 07:00


 


Intake Total 600 ml 


 


Output Total 1300 ml 


 


Balance -700 ml 


 


  


 


Intake Oral 600 ml 


 


Output Urine Total 1300 ml 


 


# Voids  2


 


# Bowel Movements  1








Laboratory Tests


11/13/18 05:50: 


White Blood Count 7.0, Red Blood Count 4.89, Hemoglobin 12.4L, Hematocrit 38.2L

, Mean Corpuscular Volume 78L, Mean Corpuscular Hemoglobin 25.4L, Mean 

Corpuscular Hemoglobin Concent 32.5, Red Cell Distribution Width 13.5, Platelet 

Count 139L, Mean Platelet Volume 8.4, Neutrophils (%) (Auto) 68.2, Lymphocytes (

%) (Auto) 16.6L, Monocytes (%) (Auto) 10.1H, Eosinophils (%) (Auto) 4.3H, 

Basophils (%) (Auto) 0.9, Sodium Level 141, Potassium Level 4.3, Chloride Level 

104, Carbon Dioxide Level 32, Blood Urea Nitrogen 19H, Creatinine 0.9, Estimat 

Glomerular Filtration Rate > 60, Glucose Level 113H, Calcium Level 9.3, 

Magnesium Level 1.7L


Height (Feet):  5


Height (Inches):  7.00


Weight (Pounds):  114


General Appearance:  WD/WN, no apparent distress, alert


EENT:  PERRL/EOMI, normal ENT inspection


Neck:  non-tender, normal alignment, supple, other - dry skin on back of neck, 

no erythema or excoriations present, no vesicles


Cardiovascular:  normal peripheral pulses, normal rate, regularly irregular, no 

gallop/murmur, no JVD


Respiratory/Chest:  chest wall non-tender, lungs clear, normal breath sounds, 

no respiratory distress, no accessory muscle use


Abdomen:  normal bowel sounds, non tender, soft, no organomegaly, no mass


Extremities:  normal range of motion, non-tender, normal inspection


Edema:  no edema noted Arm (L), no edema noted Arm (R), no edema noted Leg (L), 

no edema noted Leg (R), no edema noted Pedal (L), no edema noted Pedal (R), no 

edema noted Generalized


Neurologic:  CNs II-XII grossly normal, oriented x 3, normal mood/affect


Skin:  normal pigmentation, warm/dry











Charity Don DO Nov 13, 2018 10:29

## 2018-11-13 NOTE — PROGRESS NOTE
DATE:  11/13/2018

SUBJECTIVE:  The patient continues to be anxious.  More manageable today.

Continues to present with depressed mood.  Continues to complain of

flashbacks and nightmares.  Poor insight and judgment into his current

medical condition.  Tolerating medications.



MENTAL STATUS EXAMINATION:  The patient is alert and oriented times self,

place, and situation.  Mood is dysphoric.  Affect is constricted,

congruent with mood.  Thought process is concrete.  Thought content, no

suicidal or homicidal ideations.



ASSESSMENT:  Anxiety disorder and depression.



PLAN:  We will continue current medication.  Provide the patient with

supportive therapy and reality orientation.









  ______________________________________________

  Rodri Medina M.D.





DR:  Atif

D:  11/13/2018 13:34

T:  11/13/2018 18:39

JOB#:  [id number]

CC:

## 2018-11-13 NOTE — GENERAL PROGRESS NOTE
Assessment/Plan


Assessment/Plan


AXIS I  Major depressive disorder and PTSD.


AXIS II  Deferred.


AXIS III  As above.


AXIS IV  Low-to-moderate.


AXIS V  Global assessment of functioning is 50.





PLAN:


1. We will start the patient on Ativan p.r.n.


2. Remeron p.r.n.


3. We will continue to follow and readjust the medications.


4. prozac 20mg qam





Subjective


Date patient seen:  Nov 12, 2018


Neurologic/Psychiatric:  Reports: anxiety, depressed, emotional problems


Allergies:  


Coded Allergies:  


     AMPICILLIN (Verified  Allergy, Unknown, 8/3/18)


Uncoded Allergies:  


     PENICILLIN (Allergy, Unknown, 8/3/18)





Objective





Last 24 Hour Vital Signs








  Date Time  Temp Pulse Resp B/P (MAP) Pulse Ox O2 Delivery O2 Flow Rate FiO2


 


11/12/18 21:00  73  142/53    


 


11/12/18 21:00      Room Air  


 


11/12/18 20:09  74 16   Room Air  


 


11/12/18 20:09      Room Air  21


 


11/12/18 20:00  97      


 


11/12/18 20:00 97.9 73 22 142/53 (82) 96   


 


11/12/18 16:05 97.9 69 20 150/76 (100) 95   


 


11/12/18 16:00  88      


 


11/12/18 12:00 97.7 91 20 136/95 (109) 95   


 


11/12/18 11:56  84      


 


11/12/18 08:56  72  154/70    


 


11/12/18 08:53  72 20  99 Nasal Cannula 2.0 28


 


11/12/18 08:10  69      


 


11/12/18 08:10      Room Air  


 


11/12/18 08:06 97.9 67 20 154/70 (98) 95   


 


11/12/18 07:43  77 16   Room Air  


 


11/12/18 04:00 97.7 66 18 149/80 (103) 96   


 


11/12/18 04:00  64      

















Intake and Output  


 


 11/12/18 11/13/18





 19:00 07:00


 


Intake Total 600 ml 


 


Output Total 1300 ml 


 


Balance -700 ml 


 


  


 


Intake Oral 600 ml 


 


Output Urine Total 1300 ml 








Laboratory Tests


11/12/18 08:06: 


White Blood Count 7.2, Red Blood Count 5.05, Hemoglobin 13.0L, Hematocrit 39.7L

, Mean Corpuscular Volume 79L, Mean Corpuscular Hemoglobin 25.8L, Mean 

Corpuscular Hemoglobin Concent 32.8, Red Cell Distribution Width 13.7, Platelet 

Count 126L, Mean Platelet Volume 9.4, Neutrophils (%) (Auto) 73.6, Lymphocytes (

%) (Auto) 13.9L, Monocytes (%) (Auto) 10.0, Eosinophils (%) (Auto) 1.8, 

Basophils (%) (Auto) 0.8, Sodium Level 141, Potassium Level 4.7, Chloride Level 

106, Carbon Dioxide Level 28, Anion Gap 7, Blood Urea Nitrogen 17, Creatinine 

0.8, Estimat Glomerular Filtration Rate > 60, Glucose Level 95, Calcium Level 

8.6, Phosphorus Level 3.7, Magnesium Level 1.8


Height (Feet):  5


Height (Inches):  7.00


Weight (Pounds):  122


General Appearance:  alert, moderate distress, agitated


Neurologic:  oriented x 3, responsive, depressed affect











Rodri Medina MD Nov 13, 2018 00:37

## 2018-11-14 NOTE — DISCHARGE SUMMARY
Discharge Summary


Hospital Course


Date of Admission


Nov 9, 2018 at 10:40


Date of Discharge


Nov 13, 2018 at 18:14


Admitting Diagnosis


copd, afib


Reason for Hospitalization:  A fib w RVR


HPI


Norberto Juarez is a 68 year old male who was admitted on Nov 9, 2018 at 10:

40 for Chronic Obstructive Pulmonary Disease,Atrial


Consultations


Psychiatry: Dr. Medina


Cardiology: Dr. Pardo 


Pulmonology: Dr. Allred


Hospital Course


69 yo M PMH of Afib wih RVR on Elaquis, CHF, MI s/p PCI on 3/18 at [facility], COPD

, MDD, admitted s/p 5 foot mechanical fall from a ladder. Upon Evaluation in 

the ED patient was found to have elevated troponin of 0.25 and Afib RVR with HR 

146. CT head was negative. Patient was admitted to ICU for cardizem drip and 

transitioned to PO Lopressor. Echo showed EF of 50-55%. Patient was evaluated 

by nuclear stress test which was negative for reversible defect. Patient was 

also started on Prozac after evaluation by psychiatry. 





Patient was also found to have elevated dimer of 1.8 and CTA was completed with 

no pulmonary embolism. 





On day of discharge patient was in stable condition with HR controlled on PO 

medications. 





Imaging:








Procedure: CT C Spine no Contrast


Indication: Neck pain. Trauma


 


Technique: Continuous helical imaging of the cervical spine was obtained 

transaxially


from the skull base to the upper thoracic spine. 2-D coronal and sagittal 

reformatted


images were obtained. Automatic Exposure Control was utilized.


 


 


Total Dose length Product (DLP):  332.96 mGycm


 


CT Dose Index Volume (CTDIvol):   15.3 mGy


 


Comparison: None


 


Findings: There is no acute fracture or malalignment identified. There is no 

soft


tissue swelling identified.


 


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 

resultant


foraminal stenosis. Some of the intervertebral discs show narrowing. There are


prominent osteophytes especially at C4-5 C5-6 and C6-7.


 


Impression:  No acute injury


 


Moderate spondylosis











Procedure: CT Head no Contrast


Indication: Headache and head trauma


 


Technique: Contiguous 5 mm thick transaxial imaging of the head obtained in a 

[employer]


Sensation 64 slice CT scanner.  Soft tissue and bone windows generated.  

Automatic


Exposure Control was utilized.


 


 


Total Dose length Product (DLP):  1319.78 mGycm


 


CT Dose Index Volume (CTDIvol):   70.38 mGy


 


Comparison: none


 


Findings: There is moderate prominence of the ventricles, basal cisterns, and


cerebral sulci consistent with atrophy. Moderate,  nonspecific, white matter


hypoattenuation is noted throughout the brain consistent with chronic small 

vessel


disease.


 


There is no midline shift, edema, acute hemorrhage, mass effect, or abnormal


extra-axial fluid collections. Bones and extra osseous soft tissues are 

unremarkable.


 


Impression: No acute intracranial bleed, mass effect or edema.


 


Moderate atrophy of the brain.


 


Evidence of chronic small vessel disease involving white matter tracts.


 


 


 


The CT scanner at [facility] is accredited by the [employer] College 

of


Radiology and the scans are performed using dose optimization techniques as


appropriate to a performed exam including Automatic Exposure control. 


 


 


 











Procedure: NM Myocard Perf w/ Eject Frac


Indications: Chest pain, coronary artery disease


 


Technique: Single day single isotope protocol utilized. Initially, resting 

images


obtained using IV administration 9.5 millicuries 99M technetium Myoview.


Subsequently, patient underwent  lexiscan stress testing. See cardiology report 

for


details. During Lexiscan infusion, IV administration 30.8 mCi 99 M technetium


Myoview. SPECT and planar images obtained. SPECT images gated to 8 phases of the


cardiac cycle were also obtained, and reformatted into cine images for 

evaluation of


ejection fraction.


 


Comparison: none


 


Findings: Presence or absence of symptoms during infusion is not described on 

the


cardiology report. Per cardiology report, resting EKG demonstrates atrial


fibrillation. Presence or absence of ST changes is not described on the 

cardiology


report.  Imaging demonstrates large perfusion defect involving the apex, 

extending


well into the anterior and inferior walls. There is also subtle decreased septal


perfusion. There may be slight likely increased perfusion in the septum and 

anterior


wall near the base on the resting images, but this is very questionable. There 

is


diffuse dilatation of the left ventricular chamber.. Calculated post stress 

ejection


fraction 44%


 


Impression: Nonischemic clinical response to pharmacologic stress, per 

cardiology


report


 


Nonischemic electrocardiographic response to pharmacologic stress, per 

cardiology


report


 


Large apical, anterior, inferior, and possibly septal infarct. Very equivocal 

small


area of mateo-infarct ischemia in the anterior wall near the base and the septum


 


Calculated post stress ejection fraction 44%














EXAM: Two-dimensional and M-mode echocardiogram with Doppler and color 


Doppler.





INDICATION


Coronary artery disease





M-Mode DIMENSIONS 


IVSd   1.1 (0.7-1.1cm)   Left Atrium (MM)   4.0 (1.6-4.0cm)


LVDd   5.0 (3.5-5.6cm)   Aortic Root      2.3 (2.0-3.7cm)


PWd   1.1 (0.7-1.1cm)   Aortic Cusp Exc.   1.5 (1.5-2.0cm)





LVDs   2.5 (2.5-4.0cm)            


PWs   1.9 cm               








Normal left ventricular chamber size.


Left ventricular apical akinesis. 


Left ventricular ejection fraction estimated to be 50 %.


Borderline left ventricular hypertrophy.


Anterior Echo-free space, may be due to pericardial fat or effusion.


All other cardiac chamber sizes are within normal limits. 


Mild focal aortic valve sclerosis with adequate cusp excursion.


Mildy thickened mitral valve leaflets with normal excursion.


Mild mitral annulus and aortic root calcification.


Normal pulmonic valve structure. 


Normal tricuspid valve structure.  


IVC is normal in size with physiological collapse. 





A  color flow and spectral Doppler study was performed and revealed:


No aortic insufficiency.


Mild mitral regurgitation.


Left ventricular diastolic function could not be determined due to A-Fib. 


Trace tricuspid regurgitation.


Tricuspid systolic velocities suggests peak right ventricular systolic pressure 

of 19 


mmHg.


No pulmonic regurgitation present. 











Procedure: CTA Chest w Contrast





EXAM:


  CT Angiography Chest With Intravenous Contrast


 


CLINICAL HISTORY:


  TACH[facility]


 


TECHNIQUE:


  Axial computed tomographic angiography images of the chest with 


intravenous contrast using pulmonary embolism protocol.  CTDI is 15 mGy 


and DLP is 545 mGy-cm.  One or more of the following dose reduction 


techniques were used: automated exposure control, adjustment of the mA 


and/or kV according to patient size, use of iterative reconstruction 


technique.


  3D and MIP reconstructed images were created and reviewed.


 


COMPARISON:


  No relevant prior studies available.


 


FINDINGS:


  Limitations:  Limited due to motion.


  Pulmonary arteries:  No definite PE or aortic dissection.


  Aorta:  Atherosclerosis.  No aortic dissection.


  Great vessels of aortic arch:  Stenosis at proximal left subclavian 


artery.


  Lungs:  Possible minimal pulmonary edema/infiltrates, versus motion 


artifact.


  Pleural space:  Unremarkable.  No significant effusion.  No 


pneumothorax.


  Heart:  Unremarkable.  No cardiomegaly.  No significant pericardial 


effusion.


  Bones/joints:  No acute fracture.


  Soft tissues:  Unremarkable.


  Lymph nodes:  Unremarkable.  No enlarged lymph nodes.


 


IMPRESSION:     


1.  Limited due to motion.


2.  No definite PE or aortic dissection.


3.  Possible minimal pulmonary edema/infiltrates, versus motion artifact.





Discharge Medications


New Medications:  


Fluoxetine Hcl* (Prozac*) 20 Mg Capsule


20 MG ORAL DAILY, #30 CAP 0 Refills





Metoprolol Tartrate (Metoprolol Tartrate) 25 Mg Tablet


100 MG ORAL EVERY 12 HOURS for 30 Days, #60 TAB 0 Refills





Nicotine 14MG Patch* (Nicoderm Cq 14MG*) 1 Each Patch.td24


1 PATCH TDERMAL Q24H for 30 Days, #30 PATCH 0 Refills





 


Continued Medications:  


Apixaban (Eliquis) 5 Mg Tablet


5 MG PO TWICE A DAY, TAB (This prescription has been renewed)





Aspirin Ec* (Aspirin Ec*) 81 Mg Tablet.dr


81 MG ORAL DAILY, TAB (This prescription has been renewed)





Atorvastatin Calcium* (Atorvastatin Calcium*) 40 Mg Tablet


40 MG ORAL BEDTIME, TAB





Clopidogrel Bisulfate* (Plavix*) 75 Mg Tablet


75 MG ORAL DAILY, TAB (This prescription has been renewed)





Famotidine (Famotidine) 20 Mg Tablet


20 MG ORAL TWICE A DAY, #60 TAB 0 Refills (This prescription has been renewed)





Ipratropium/Albuterol Sulfate (DuoNeb 0.5-3(2.5)mg/3ml) 3 Ml Ampul.neb


3 ML HHN Q4HR, EA (This prescription has been renewed)





 


Discontinued Medications:  


Acetaminophen* (Acetaminophen 325MG Tablet*) 325 Mg Tablet


650 MG ORAL Q4H PRN for Mild Pain/Temp > 100.5, TAB





Carvedilol (Coreg) 3.125 Mg Tablet


12.5 MG ORAL EVERY 12 HOURS, TAB





Carvedilol (Coreg) 12.5 Mg Tablet


12.5 MG ORAL EVERY 12 HOURS, TAB





Docusate Sodium* (Docusate Sodium*) 100 Mg Capsule


100 MG ORAL Q12HR, CAP





Doxycycline Hyclate* (Vibramycin*) 100 Mg Capsule


100 MG ORAL EVERY 12 HOURS PRN for x3 days for 3 Days, #3 CAP 0 Refills





Furosemide (Furosemide) 10 Mg/1 Ml Solution


40 MG PO DAILY





Polyethylene Glycol 3350* (Miralax*) 17 Gm Powd.pack


17 GM ORAL Q12HR PRN for Constipation, PACKET





Prednisone* (Prednisone*) 10 Mg Tablet


30 MG ORAL DAILY, #10 TAB 0 Refills





Prednisone* (Prednisone*) 10 Mg Tablet


30 MG ORAL DAILY for x1 day for 1 Day, TAB 0 Refills





Tramadol Hcl* (Ultram*) 50 Mg Tablet


50 MG ORAL Q6H PRN for For Pain, #14 TAB 0 Refills





Zolpidem Tartrate* (Ambien*) 5 Mg Tablet


5 MG ORAL BEDTIME PRN for Insomnia, TAB











Discharge


Condition Upon Discharge:  stable


Discharge Disposition


Patient was discharged to Home (01)











Charity Don DO Nov 14, 2018 10:26